# Patient Record
Sex: MALE | Race: WHITE | Employment: FULL TIME | URBAN - METROPOLITAN AREA
[De-identification: names, ages, dates, MRNs, and addresses within clinical notes are randomized per-mention and may not be internally consistent; named-entity substitution may affect disease eponyms.]

---

## 2017-03-01 ENCOUNTER — OFFICE VISIT (OUTPATIENT)
Dept: FAMILY MEDICINE CLINIC | Facility: CLINIC | Age: 42
End: 2017-03-01

## 2017-03-01 ENCOUNTER — TELEPHONE (OUTPATIENT)
Dept: FAMILY MEDICINE CLINIC | Facility: CLINIC | Age: 42
End: 2017-03-01

## 2017-03-01 VITALS
HEART RATE: 96 BPM | SYSTOLIC BLOOD PRESSURE: 136 MMHG | DIASTOLIC BLOOD PRESSURE: 72 MMHG | RESPIRATION RATE: 18 BRPM | BODY MASS INDEX: 29.59 KG/M2 | WEIGHT: 199.81 LBS | TEMPERATURE: 100 F | HEIGHT: 69 IN

## 2017-03-01 DIAGNOSIS — R50.9 FEVER, UNSPECIFIED FEVER CAUSE: ICD-10-CM

## 2017-03-01 DIAGNOSIS — H65.01 RIGHT ACUTE SEROUS OTITIS MEDIA, RECURRENCE NOT SPECIFIED: Primary | ICD-10-CM

## 2017-03-01 PROCEDURE — 99213 OFFICE O/P EST LOW 20 MIN: CPT | Performed by: INTERNAL MEDICINE

## 2017-03-01 RX ORDER — AMOXICILLIN AND CLAVULANATE POTASSIUM 875; 125 MG/1; MG/1
1 TABLET, FILM COATED ORAL 2 TIMES DAILY
Qty: 20 TABLET | Refills: 0 | Status: SHIPPED | OUTPATIENT
Start: 2017-03-01 | End: 2017-03-11

## 2017-03-01 RX ORDER — CODEINE PHOSPHATE AND GUAIFENESIN 10; 100 MG/5ML; MG/5ML
5 SOLUTION ORAL EVERY 6 HOURS PRN
Qty: 120 ML | Refills: 0 | Status: SHIPPED
Start: 2017-03-01 | End: 2017-03-11

## 2017-03-01 NOTE — PROGRESS NOTES
CC: Patient presents with:   Body ache and/or chills: x 1 week - worse at night  Night Sweats  Wheezing: SOB  Ear Pain  Fever       HPI:     Symtoms started   Night sweats  Wheezing and shortness of breath   + bilateral ear pain   + fever  + shortness o normocephalic, OP-erythematous, PERRL, TM: significant erythema through the right canal with bulging TM   NECK: supple, + adenopathy  LUNGS: crackles at b/l lung bases  CARDIO: RRR without murmur  GI: good BS's,NT/ND, no masses or HSM  EXTREMITIES: no cyan

## 2017-03-15 ENCOUNTER — OFFICE VISIT (OUTPATIENT)
Dept: FAMILY MEDICINE CLINIC | Facility: CLINIC | Age: 42
End: 2017-03-15

## 2017-03-15 VITALS
HEART RATE: 72 BPM | HEIGHT: 69 IN | DIASTOLIC BLOOD PRESSURE: 76 MMHG | WEIGHT: 195.81 LBS | RESPIRATION RATE: 16 BRPM | BODY MASS INDEX: 29 KG/M2 | TEMPERATURE: 99 F | SYSTOLIC BLOOD PRESSURE: 120 MMHG

## 2017-03-15 DIAGNOSIS — M54.16 LUMBAR RADICULOPATHY: ICD-10-CM

## 2017-03-15 DIAGNOSIS — I10 ESSENTIAL HYPERTENSION: ICD-10-CM

## 2017-03-15 DIAGNOSIS — Z00.00 ANNUAL PHYSICAL EXAM: Primary | ICD-10-CM

## 2017-03-15 DIAGNOSIS — Z00.00 LABORATORY EXAMINATION ORDERED AS PART OF A ROUTINE GENERAL MEDICAL EXAMINATION: ICD-10-CM

## 2017-03-15 DIAGNOSIS — F41.1 ANXIETY STATE: ICD-10-CM

## 2017-03-15 PROCEDURE — 99396 PREV VISIT EST AGE 40-64: CPT | Performed by: INTERNAL MEDICINE

## 2017-03-15 PROCEDURE — 99213 OFFICE O/P EST LOW 20 MIN: CPT | Performed by: INTERNAL MEDICINE

## 2017-03-15 RX ORDER — HYDROCODONE BITARTRATE AND ACETAMINOPHEN 10; 325 MG/1; MG/1
1 TABLET ORAL 2 TIMES DAILY PRN
Qty: 60 TABLET | Refills: 0 | Status: SHIPPED | OUTPATIENT
Start: 2017-03-15 | End: 2017-10-03

## 2017-03-15 NOTE — PROGRESS NOTES
REASON FOR VISIT:    Jaci Duron is a 37year old male who presents for an [address].     Needs a refill on norco  Still doing inversion table/ massage therapy/     Hypertension  Denies any chest pain or shortness of breath  Denies any l ALT 16 02/14/2014       Lab Results  Component Value Date   TSH 1.960 07/18/2016       Lab Results  Component Value Date   BUN 12 07/18/2016   BUN 15 07/11/2014   BUN 13 02/14/2014   CREATSERUM 1.03 07/18/2016   CREATSERUM 1.02 07/11/2014   CREATSERUM 1. (calc))   Date Value   07/11/2014 148*       Diabetes Screening  If history of high blood pressure or other  risk factors HGBA1C (%)   Date Value   07/18/2016 5.4     GLUCOSE (mg/dL)   Date Value   07/18/2016 108*   07/11/2014 88         Gonorrhea Screenin Tab Take 1 tablet by mouth 2 (two) times daily as needed for Pain. Disp: 60 tablet Rfl: 0   PARoxetine HCl 20 MG Oral Tab Take 1 tablet (20 mg total) by mouth daily.  Disp: 90 tablet Rfl: 3   lisinopril 40 MG Oral Tab Take 1 tablet (40 mg total) by mouth da anemia  ENDOCRINE: denies thyroid history  ALL/ASTHMA: denies hx of allergy or asthma    EXAM:   /76 mmHg  Pulse 72  Temp(Src) 98.5 °F (36.9 °C) (Temporal)  Resp 16  Ht 69\"  Wt 195 lb 12.8 oz  BMI 28.90 kg/m2  GENERAL: well developed, well nourished understanding of these issues and agrees to the plan.   The patient is asked to return in 1 year for evaluation   Diet counseling perfomed  Exercise counseling perfomed  Dailiy aspirin Risk/Benefits counseling performed    SUGGESTED VACCINATIONS - Influenza

## 2017-06-13 RX ORDER — LISINOPRIL 40 MG/1
40 TABLET ORAL DAILY
Qty: 90 TABLET | Refills: 1 | Status: SHIPPED | OUTPATIENT
Start: 2017-06-13 | End: 2017-10-03

## 2017-06-13 NOTE — TELEPHONE ENCOUNTER
Requesting Lisinopril 40mg daily  LOV: 3/15/17  RTC: 1 year  Last Labs: 7/18/16  Filled: 12/6/16 #90 with 1 refills    Future Appointments  Date Time Provider Dania Govea   8/31/2017 8:00 AM [facility] CT MAIN RM2 [facility] [place] protocol - me

## 2017-08-31 ENCOUNTER — HOSPITAL ENCOUNTER (OUTPATIENT)
Dept: CT IMAGING | Facility: HOSPITAL | Age: 42
Discharge: HOME | End: 2017-08-31
Attending: INTERNAL MEDICINE

## 2017-08-31 DIAGNOSIS — Z13.9 ENCOUNTER FOR SCREENING: ICD-10-CM

## 2017-09-08 ENCOUNTER — TELEPHONE (OUTPATIENT)
Dept: FAMILY MEDICINE CLINIC | Facility: CLINIC | Age: 42
End: 2017-09-08

## 2017-09-08 DIAGNOSIS — J84.10 CALCIFIED GRANULOMA OF LUNG (HCC): Primary | ICD-10-CM

## 2017-09-08 NOTE — TELEPHONE ENCOUNTER
I spoke with CT department per Dr. Ricki Winchester request to verify what kind of CT of the chest is best if patient has calcified granuloma of lung - per radiologist it needs to be CT plain (without contrast)  Order placed

## 2017-09-08 NOTE — TELEPHONE ENCOUNTER
Dr had approved CT with and without and radiology said best if plain. That order was placed, patient aware and CT with and without was cancelled by me.     Time started: 7695    Time ended: 1248    Total time spent on chart: 2min

## 2017-10-03 ENCOUNTER — OFFICE VISIT (OUTPATIENT)
Dept: FAMILY MEDICINE CLINIC | Facility: CLINIC | Age: 42
End: 2017-10-03

## 2017-10-03 VITALS
WEIGHT: 197.81 LBS | HEIGHT: 69 IN | DIASTOLIC BLOOD PRESSURE: 82 MMHG | TEMPERATURE: 98 F | RESPIRATION RATE: 16 BRPM | HEART RATE: 76 BPM | BODY MASS INDEX: 29.3 KG/M2 | SYSTOLIC BLOOD PRESSURE: 124 MMHG

## 2017-10-03 DIAGNOSIS — F41.1 ANXIETY STATE: ICD-10-CM

## 2017-10-03 DIAGNOSIS — E78.2 MIXED HYPERLIPIDEMIA: ICD-10-CM

## 2017-10-03 DIAGNOSIS — M54.16 LUMBAR RADICULOPATHY: ICD-10-CM

## 2017-10-03 DIAGNOSIS — J84.10 CALCIFIED GRANULOMA OF LUNG (HCC): ICD-10-CM

## 2017-10-03 DIAGNOSIS — I10 ESSENTIAL HYPERTENSION: Primary | ICD-10-CM

## 2017-10-03 PROBLEM — J98.4 CALCIFIED GRANULOMA OF LUNG: Status: ACTIVE | Noted: 2017-10-03

## 2017-10-03 PROCEDURE — 99214 OFFICE O/P EST MOD 30 MIN: CPT | Performed by: INTERNAL MEDICINE

## 2017-10-03 RX ORDER — LISINOPRIL 40 MG/1
40 TABLET ORAL DAILY
Qty: 90 TABLET | Refills: 3 | Status: SHIPPED | OUTPATIENT
Start: 2017-10-03 | End: 2018-12-03

## 2017-10-03 RX ORDER — HYDROCODONE BITARTRATE AND ACETAMINOPHEN 10; 325 MG/1; MG/1
1 TABLET ORAL 2 TIMES DAILY PRN
Qty: 60 TABLET | Refills: 0 | Status: SHIPPED | OUTPATIENT
Start: 2017-10-03 | End: 2018-02-14

## 2017-10-03 RX ORDER — PAROXETINE HYDROCHLORIDE 20 MG/1
20 TABLET, FILM COATED ORAL DAILY
Qty: 90 TABLET | Refills: 3 | Status: SHIPPED | OUTPATIENT
Start: 2017-10-03 | End: 2018-12-03

## 2017-10-03 RX ORDER — ATORVASTATIN CALCIUM 20 MG/1
20 TABLET, FILM COATED ORAL NIGHTLY
Qty: 90 TABLET | Refills: 1 | Status: SHIPPED | OUTPATIENT
Start: 2017-10-03 | End: 2018-02-14

## 2017-10-03 RX ORDER — ATORVASTATIN CALCIUM 20 MG/1
20 TABLET, FILM COATED ORAL NIGHTLY
Qty: 90 TABLET | Refills: 1 | Status: SHIPPED | OUTPATIENT
Start: 2017-10-03 | End: 2017-10-03

## 2017-10-03 NOTE — PROGRESS NOTES
CC: Patient presents with:   Follow - Up  Test Results: CT Calcium Score done 8/2017  Medication Request: refill [place]  Imm/Inj: states will get Flu vaccine at his work       HPI:     Still struggling with back and foot pain   Still using tens unit and 29.21 kg/m²   GENERAL: well developed, well nourished,in no apparent distress, A/O x3  SKIN: no rashes,no suspicious lesions  HEENT: atraumatic, normocephalic, OP-clear, PERRLA  NECK: supple,no adenopathy  LUNGS: clear to auscultation bilaterally   CARDIO:

## 2018-02-14 ENCOUNTER — OFFICE VISIT (OUTPATIENT)
Dept: FAMILY MEDICINE CLINIC | Facility: CLINIC | Age: 43
End: 2018-02-14

## 2018-02-14 VITALS
BODY MASS INDEX: 29.27 KG/M2 | HEART RATE: 82 BPM | OXYGEN SATURATION: 99 % | SYSTOLIC BLOOD PRESSURE: 124 MMHG | WEIGHT: 197.63 LBS | HEIGHT: 69 IN | RESPIRATION RATE: 16 BRPM | DIASTOLIC BLOOD PRESSURE: 70 MMHG | TEMPERATURE: 98 F

## 2018-02-14 DIAGNOSIS — F41.1 ANXIETY STATE: ICD-10-CM

## 2018-02-14 DIAGNOSIS — Z00.00 WELLNESS EXAMINATION: Primary | ICD-10-CM

## 2018-02-14 DIAGNOSIS — E78.2 MIXED HYPERLIPIDEMIA: ICD-10-CM

## 2018-02-14 DIAGNOSIS — M54.16 LUMBAR RADICULOPATHY: ICD-10-CM

## 2018-02-14 DIAGNOSIS — I10 ESSENTIAL HYPERTENSION: ICD-10-CM

## 2018-02-14 PROCEDURE — 99396 PREV VISIT EST AGE 40-64: CPT | Performed by: PHYSICIAN ASSISTANT

## 2018-02-14 RX ORDER — HYDROCODONE BITARTRATE AND ACETAMINOPHEN 10; 325 MG/1; MG/1
1 TABLET ORAL 2 TIMES DAILY PRN
Qty: 60 TABLET | Refills: 0 | Status: SHIPPED | OUTPATIENT
Start: 2018-02-14 | End: 2018-12-13

## 2018-02-14 NOTE — PATIENT INSTRUCTIONS
Thank you for choosing Horald Mcardle, PA-C at [place] [address]  To Do: Erinn Jany  1. Pt to continue medications  2. Get lab work done  3.  Follow-up in 6 months, sooner if problems    • Please signup for MY CHART, which is electronic access to your testing, please call Central Scheduling at [phone]  Please allow our office 5 business days to contact you regarding any testing results.     Refill policies:   Allow 3 business days for refills; controlled substances may take longer and must be p

## 2018-02-14 NOTE — PROGRESS NOTES
REASON FOR VISIT:    Odin Anthony is a 40year old male who presents for an [address].     HLD  Pt was started on lipitor  And had bad muscle aches, also had that with simvastatin    Anxiety  Pt is doing well on paroxetine    Lumbar radic  (H) 02/14/2014       Lab Results  Component Value Date   AST 20 07/18/2016   AST 20 07/11/2014   AST 16 02/14/2014       Lab Results  Component Value Date   ALT 36 07/18/2016   ALT 20 07/11/2014   ALT 16 02/14/2014       Lab Results  Component Va Cholesterol Screening Recommended screening varies with age, risk and gender LDL-CHOLESTEROL (mg/dL (calc))   Date Value   07/11/2014 148 (H)     LDL Cholesterol (mg/dL)   Date Value   07/18/2016 102       Diabetes Screening  If history of high blood pre found. Asthma  (Annually between Nov. 1 & Dec. 31)    Date of last AAP/ACT and counseling given on importance of controller meds.                    ALLERGIES:     Simvastatin             Myalgia    CURRENT MEDICATIONS:     Current Outpatient [facility] or ST  LUNGS: denies shortness of breath with exertion  CARDIOVASCULAR: denies chest pain on exertion  GI: denies abdominal pain, denies heartburn  : denies nocturia or changes in stream  MUSCULOSKELETAL: denies back pain  NEURO: denies headaches  PSYCHE hypertension  Chronic controlled on lisinopril  Will continue    Anxiety state  Chronic controlled on paroxetine  Will continue     The patient indicates understanding of these issues and agrees to the plan.   The patient is asked to return in 6 months  Die

## 2018-04-26 ENCOUNTER — TELEPHONE (OUTPATIENT)
Dept: FAMILY MEDICINE CLINIC | Facility: CLINIC | Age: 43
End: 2018-04-26

## 2018-12-03 ENCOUNTER — TELEPHONE (OUTPATIENT)
Dept: FAMILY MEDICINE CLINIC | Facility: CLINIC | Age: 43
End: 2018-12-03

## 2018-12-03 RX ORDER — PAROXETINE HYDROCHLORIDE 20 MG/1
20 TABLET, FILM COATED ORAL DAILY
Qty: 30 TABLET | Refills: 0 | Status: SHIPPED | OUTPATIENT
Start: 2018-12-03 | End: 2018-12-13

## 2018-12-03 RX ORDER — LISINOPRIL 40 MG/1
40 TABLET ORAL DAILY
Qty: 30 TABLET | Refills: 0 | Status: SHIPPED | OUTPATIENT
Start: 2018-12-03 | End: 2018-12-13

## 2018-12-03 NOTE — TELEPHONE ENCOUNTER
Authorized Provider - Niraj Silveira    lisinopril 40 MG Oral Tab #90  AND   PARoxetine HCl 20 MG Oral Tab #90    [facility]

## 2018-12-03 NOTE — TELEPHONE ENCOUNTER
Requesting Lisinopril and Paroxetine  LOV: 2/14/18  RTC: 6 months  Last Relevant Labs: 7/18/16  Filled: 10/3/17 #90 with 3 refills    No future appointments. Patient is overdue for labs and office visit.    One month exception refills sent with note to f

## 2018-12-13 ENCOUNTER — OFFICE VISIT (OUTPATIENT)
Dept: FAMILY MEDICINE CLINIC | Facility: CLINIC | Age: 43
End: 2018-12-13
Payer: COMMERCIAL

## 2018-12-13 VITALS
DIASTOLIC BLOOD PRESSURE: 78 MMHG | WEIGHT: 193 LBS | SYSTOLIC BLOOD PRESSURE: 124 MMHG | HEART RATE: 68 BPM | RESPIRATION RATE: 16 BRPM | BODY MASS INDEX: 28.58 KG/M2 | HEIGHT: 69 IN

## 2018-12-13 DIAGNOSIS — F41.9 ANXIETY: ICD-10-CM

## 2018-12-13 DIAGNOSIS — Z01.89 ROUTINE LAB DRAW: ICD-10-CM

## 2018-12-13 DIAGNOSIS — M54.16 LUMBAR RADICULOPATHY: Primary | ICD-10-CM

## 2018-12-13 DIAGNOSIS — E78.2 MIXED HYPERLIPIDEMIA: ICD-10-CM

## 2018-12-13 DIAGNOSIS — I10 ESSENTIAL HYPERTENSION: ICD-10-CM

## 2018-12-13 PROCEDURE — 99214 OFFICE O/P EST MOD 30 MIN: CPT | Performed by: PHYSICIAN ASSISTANT

## 2018-12-13 RX ORDER — PAROXETINE HYDROCHLORIDE 20 MG/1
20 TABLET, FILM COATED ORAL DAILY
Qty: 90 TABLET | Refills: 3 | Status: SHIPPED | OUTPATIENT
Start: 2018-12-13 | End: 2020-01-02

## 2018-12-13 RX ORDER — HYDROCODONE BITARTRATE AND ACETAMINOPHEN 10; 325 MG/1; MG/1
1 TABLET ORAL 2 TIMES DAILY PRN
Qty: 60 TABLET | Refills: 0 | Status: SHIPPED | OUTPATIENT
Start: 2018-12-13 | End: 2019-08-21

## 2018-12-13 RX ORDER — LISINOPRIL 40 MG/1
40 TABLET ORAL DAILY
Qty: 90 TABLET | Refills: 3 | Status: SHIPPED | OUTPATIENT
Start: 2018-12-13 | End: 2020-01-02

## 2018-12-13 NOTE — PROGRESS NOTES
[facility] Internal Medicine Progress Note    CC:  Patient presents with:  Hypertension  Medication Request      HPI:   HPI  HTN  Chronic stable on lisinopril  Denies any SOB/CP, leg swelling, headaches    Lumbar radicular pain  Pt has history telly light.   Neck: Neck supple. No thyromegaly present. Cardiovascular: Normal rate, regular rhythm and normal heart sounds. Exam reveals no gallop and no friction rub. No murmur heard.   Pulmonary/Chest: Effort normal and breath sounds normal. No respirato Outcome: Patient verbalizes understanding.     Problem List:  Patient Active Problem List:     Mixed hyperlipidemia     Screening for lipoid disorders     Irritable bowel syndrome     Anxiety state     Essential hypertension     Renal artery stenosis (HCC)

## 2018-12-13 NOTE — PATIENT INSTRUCTIONS
Thank you for choosing Bhumika Valenzuela PA-C at [place] [address]  To Do: Krista Silveira  1. Pt to get labs done  2. Continue medications  3.  Follow-up 6 months, sooner if problems    • Please signup for MY CHART, which is electronic access to your r testing, please call Central Scheduling at [phone]  Please allow our office 5 business days to contact you regarding any testing results.     Refill policies:   Allow 3 business days for refills; controlled substances may take longer and must be picked

## 2019-02-07 ENCOUNTER — LAB ENCOUNTER (OUTPATIENT)
Dept: LAB | Age: 44
End: 2019-02-07
Attending: PHYSICIAN ASSISTANT
Payer: COMMERCIAL

## 2019-02-07 DIAGNOSIS — Z01.89 ROUTINE LAB DRAW: ICD-10-CM

## 2019-02-07 LAB
ALBUMIN SERPL-MCNC: 4 G/DL (ref 3.1–4.5)
ALBUMIN/GLOB SERPL: 1.3 {RATIO} (ref 1–2)
ALP LIVER SERPL-CCNC: 48 U/L (ref 45–117)
ALT SERPL-CCNC: 27 U/L (ref 17–63)
ANION GAP SERPL CALC-SCNC: 7 MMOL/L (ref 0–18)
AST SERPL-CCNC: 20 U/L (ref 15–41)
BASOPHILS # BLD AUTO: 0.03 X10(3) UL (ref 0–0.2)
BASOPHILS NFR BLD AUTO: 0.5 %
BILIRUB SERPL-MCNC: 0.5 MG/DL (ref 0.1–2)
BUN BLD-MCNC: 13 MG/DL (ref 8–20)
BUN/CREAT SERPL: 13.5 (ref 10–20)
CALCIUM BLD-MCNC: 8.7 MG/DL (ref 8.3–10.3)
CHLORIDE SERPL-SCNC: 107 MMOL/L (ref 101–111)
CHOLEST SMN-MCNC: 223 MG/DL (ref ?–200)
CO2 SERPL-SCNC: 25 MMOL/L (ref 22–32)
CREAT BLD-MCNC: 0.96 MG/DL (ref 0.7–1.3)
DEPRECATED RDW RBC AUTO: 39.6 FL (ref 35.1–46.3)
EOSINOPHIL # BLD AUTO: 0.19 X10(3) UL (ref 0–0.7)
EOSINOPHIL NFR BLD AUTO: 3.4 %
ERYTHROCYTE [DISTWIDTH] IN BLOOD BY AUTOMATED COUNT: 12.8 % (ref 11–15)
GLOBULIN PLAS-MCNC: 3.2 G/DL (ref 2.8–4.4)
GLUCOSE BLD-MCNC: 94 MG/DL (ref 70–99)
HAV AB SERPL IA-ACNC: 327 PG/ML (ref 193–986)
HCT VFR BLD AUTO: 48.3 % (ref 39–53)
HDLC SERPL-MCNC: 34 MG/DL (ref 40–59)
HGB BLD-MCNC: 16.4 G/DL (ref 13–17.5)
IMM GRANULOCYTES # BLD AUTO: 0.01 X10(3) UL (ref 0–1)
IMM GRANULOCYTES NFR BLD: 0.2 %
LDLC SERPL CALC-MCNC: 127 MG/DL (ref ?–100)
LYMPHOCYTES # BLD AUTO: 2.67 X10(3) UL (ref 1–4)
LYMPHOCYTES NFR BLD AUTO: 48.3 %
M PROTEIN MFR SERPL ELPH: 7.2 G/DL (ref 6.4–8.2)
MCH RBC QN AUTO: 29.2 PG (ref 26–34)
MCHC RBC AUTO-ENTMCNC: 34 G/DL (ref 31–37)
MCV RBC AUTO: 86.1 FL (ref 80–100)
MONOCYTES # BLD AUTO: 0.37 X10(3) UL (ref 0.1–1)
MONOCYTES NFR BLD AUTO: 6.7 %
NEUTROPHILS # BLD AUTO: 2.26 X10 (3) UL (ref 1.5–7.7)
NEUTROPHILS # BLD AUTO: 2.26 X10(3) UL (ref 1.5–7.7)
NEUTROPHILS NFR BLD AUTO: 40.9 %
NONHDLC SERPL-MCNC: 189 MG/DL (ref ?–130)
OSMOLALITY SERPL CALC.SUM OF ELEC: 288 MOSM/KG (ref 275–295)
PLATELET # BLD AUTO: 164 10(3)UL (ref 150–450)
POTASSIUM SERPL-SCNC: 4 MMOL/L (ref 3.6–5.1)
RBC # BLD AUTO: 5.61 X10(6)UL (ref 4.3–5.7)
SODIUM SERPL-SCNC: 139 MMOL/L (ref 136–144)
T4 FREE SERPL-MCNC: 0.8 NG/DL (ref 0.9–1.8)
TRIGL SERPL-MCNC: 309 MG/DL (ref 30–149)
TSI SER-ACNC: 3.21 MIU/ML (ref 0.35–5.5)
VIT D+METAB SERPL-MCNC: 19.3 NG/ML (ref 30–100)
VLDLC SERPL CALC-MCNC: 62 MG/DL (ref 0–30)
WBC # BLD AUTO: 5.5 X10(3) UL (ref 4–11)

## 2019-02-07 PROCEDURE — 36415 COLL VENOUS BLD VENIPUNCTURE: CPT | Performed by: PHYSICIAN ASSISTANT

## 2019-02-07 PROCEDURE — 82306 VITAMIN D 25 HYDROXY: CPT | Performed by: PHYSICIAN ASSISTANT

## 2019-02-07 PROCEDURE — 80061 LIPID PANEL: CPT | Performed by: PHYSICIAN ASSISTANT

## 2019-02-07 PROCEDURE — 80050 GENERAL HEALTH PANEL: CPT | Performed by: PHYSICIAN ASSISTANT

## 2019-02-07 PROCEDURE — 84439 ASSAY OF FREE THYROXINE: CPT | Performed by: PHYSICIAN ASSISTANT

## 2019-02-07 PROCEDURE — 82607 VITAMIN B-12: CPT | Performed by: PHYSICIAN ASSISTANT

## 2019-02-11 ENCOUNTER — TELEPHONE (OUTPATIENT)
Dept: FAMILY MEDICINE CLINIC | Facility: CLINIC | Age: 44
End: 2019-02-11

## 2019-02-11 DIAGNOSIS — R79.89 ELEVATED TSH: ICD-10-CM

## 2019-02-11 DIAGNOSIS — E78.2 ELEVATED CHOLESTEROL WITH ELEVATED TRIGLYCERIDES: Primary | ICD-10-CM

## 2019-02-11 RX ORDER — ERGOCALCIFEROL 1.25 MG/1
50000 CAPSULE ORAL WEEKLY
Qty: 12 CAPSULE | Refills: 0 | Status: SHIPPED | OUTPATIENT
Start: 2019-02-11 | End: 2019-05-12

## 2019-02-11 RX ORDER — FENOFIBRATE 120 MG/1
1 TABLET ORAL DAILY
Qty: 30 TABLET | Refills: 0 | Status: SHIPPED | OUTPATIENT
Start: 2019-02-11 | End: 2019-03-12

## 2019-02-11 NOTE — PROGRESS NOTES
Cholesterol and triglycerides are elevated, I would recommend starting a medication if he is not opposed, I know he does not want a statin, so I would recommend fenofibrate 120mg daily  TSH is more elevated than typical and Free T4 is low, I would repeat T

## 2019-02-11 NOTE — TELEPHONE ENCOUNTER
----- Message from Raymundo Fernandes PA-C sent at 2/11/2019  8:03 AM CST -----  Cholesterol and triglycerides are elevated, I would recommend starting a medication if he is not opposed, I know he does not want a statin, so I would recommend fenofibrate 1

## 2019-02-11 NOTE — TELEPHONE ENCOUNTER
----- Message from Zurdo Betts PA-C sent at 2/11/2019  8:03 AM CST -----  Cholesterol and triglycerides are elevated, I would recommend starting a medication if he is not opposed, I know he does not want a statin, so I would recommend fenofibrate 1

## 2019-03-12 DIAGNOSIS — E78.2 MIXED HYPERLIPIDEMIA: Primary | ICD-10-CM

## 2019-03-12 DIAGNOSIS — E78.2 ELEVATED CHOLESTEROL WITH ELEVATED TRIGLYCERIDES: ICD-10-CM

## 2019-03-13 RX ORDER — FENOFIBRATE 120 MG/1
TABLET ORAL
Qty: 30 TABLET | Refills: 1 | Status: SHIPPED | OUTPATIENT
Start: 2019-03-13 | End: 2019-08-21

## 2019-05-06 RX ORDER — ERGOCALCIFEROL 1.25 MG/1
CAPSULE ORAL
Qty: 12 CAPSULE | Refills: 0 | OUTPATIENT
Start: 2019-05-06

## 2019-05-06 NOTE — TELEPHONE ENCOUNTER
Requesting Vitamin D 50,000units weekly  LOV: 12/13/18  RTC: 6 months  Last Relevant Labs: 2/7/19  Filled: 2/11/19 #12 with 0 refills    No future appointments.     Notes recorded by Rogelio Noel PA-C on 2/11/2019 at 8:03 AM CST  Low Vit D, begin 50

## 2019-07-01 ENCOUNTER — RESULT REVIEW (OUTPATIENT)
Age: 44
End: 2019-07-01

## 2019-08-14 ENCOUNTER — TELEPHONE (OUTPATIENT)
Dept: FAMILY MEDICINE CLINIC | Facility: CLINIC | Age: 44
End: 2019-08-14

## 2019-08-14 ENCOUNTER — LAB ENCOUNTER (OUTPATIENT)
Dept: LAB | Age: 44
End: 2019-08-14
Attending: PHYSICIAN ASSISTANT
Payer: COMMERCIAL

## 2019-08-14 DIAGNOSIS — R79.89 ELEVATED TSH: ICD-10-CM

## 2019-08-14 DIAGNOSIS — E78.2 MIXED HYPERLIPIDEMIA: ICD-10-CM

## 2019-08-14 DIAGNOSIS — R53.83 FATIGUE, UNSPECIFIED TYPE: ICD-10-CM

## 2019-08-14 DIAGNOSIS — E78.2 ELEVATED CHOLESTEROL WITH ELEVATED TRIGLYCERIDES: ICD-10-CM

## 2019-08-14 DIAGNOSIS — R53.83 FATIGUE, UNSPECIFIED TYPE: Primary | ICD-10-CM

## 2019-08-14 LAB
ALT SERPL-CCNC: 31 U/L (ref 16–61)
AST SERPL-CCNC: 15 U/L (ref 15–37)
CHOLEST SMN-MCNC: 226 MG/DL (ref ?–200)
HDLC SERPL-MCNC: 32 MG/DL (ref 40–59)
LDLC SERPL CALC-MCNC: 125 MG/DL (ref ?–100)
NONHDLC SERPL-MCNC: 194 MG/DL (ref ?–130)
T3FREE SERPL-MCNC: 2.92 PG/ML (ref 2.4–4.2)
T4 FREE SERPL-MCNC: 0.7 NG/DL (ref 0.8–1.7)
TRIGL SERPL-MCNC: 343 MG/DL (ref 30–149)
TSI SER-ACNC: 3.02 MIU/ML (ref 0.36–3.74)
VLDLC SERPL CALC-MCNC: 69 MG/DL (ref 0–30)

## 2019-08-14 PROCEDURE — 84481 FREE ASSAY (FT-3): CPT

## 2019-08-14 PROCEDURE — 84402 ASSAY OF FREE TESTOSTERONE: CPT

## 2019-08-14 PROCEDURE — 84443 ASSAY THYROID STIM HORMONE: CPT

## 2019-08-14 PROCEDURE — 36415 COLL VENOUS BLD VENIPUNCTURE: CPT

## 2019-08-14 PROCEDURE — 84403 ASSAY OF TOTAL TESTOSTERONE: CPT

## 2019-08-14 PROCEDURE — 84450 TRANSFERASE (AST) (SGOT): CPT

## 2019-08-14 PROCEDURE — 84439 ASSAY OF FREE THYROXINE: CPT

## 2019-08-14 PROCEDURE — 84460 ALANINE AMINO (ALT) (SGPT): CPT

## 2019-08-14 PROCEDURE — 80061 LIPID PANEL: CPT

## 2019-08-18 LAB
TESTOSTERONE TOTAL: 328 NG/DL
TESTOSTERONE, FREE -MS/MS: 109.9 PG/ML

## 2019-08-21 ENCOUNTER — OFFICE VISIT (OUTPATIENT)
Dept: FAMILY MEDICINE CLINIC | Facility: CLINIC | Age: 44
End: 2019-08-21
Payer: COMMERCIAL

## 2019-08-21 VITALS
HEART RATE: 83 BPM | DIASTOLIC BLOOD PRESSURE: 72 MMHG | BODY MASS INDEX: 29.03 KG/M2 | HEIGHT: 69 IN | SYSTOLIC BLOOD PRESSURE: 124 MMHG | WEIGHT: 196 LBS | OXYGEN SATURATION: 99 % | RESPIRATION RATE: 16 BRPM | TEMPERATURE: 97 F

## 2019-08-21 DIAGNOSIS — I10 ESSENTIAL HYPERTENSION: Primary | ICD-10-CM

## 2019-08-21 DIAGNOSIS — M54.16 LUMBAR RADICULOPATHY: ICD-10-CM

## 2019-08-21 DIAGNOSIS — E78.2 ELEVATED CHOLESTEROL WITH ELEVATED TRIGLYCERIDES: ICD-10-CM

## 2019-08-21 DIAGNOSIS — E03.9 HYPOTHYROIDISM, UNSPECIFIED TYPE: ICD-10-CM

## 2019-08-21 PROCEDURE — 99214 OFFICE O/P EST MOD 30 MIN: CPT | Performed by: PHYSICIAN ASSISTANT

## 2019-08-21 RX ORDER — FENOFIBRATE 120 MG/1
1 TABLET ORAL
Qty: 90 TABLET | Refills: 3 | Status: SHIPPED | OUTPATIENT
Start: 2019-08-21 | End: 2020-02-19

## 2019-08-21 RX ORDER — LEVOTHYROXINE SODIUM 0.03 MG/1
25 TABLET ORAL
Qty: 90 TABLET | Refills: 1 | Status: SHIPPED | OUTPATIENT
Start: 2019-08-21 | End: 2020-09-02 | Stop reason: DRUGHIGH

## 2019-08-21 RX ORDER — HYDROCODONE BITARTRATE AND ACETAMINOPHEN 10; 325 MG/1; MG/1
1 TABLET ORAL 2 TIMES DAILY PRN
Qty: 60 TABLET | Refills: 0 | Status: SHIPPED | OUTPATIENT
Start: 2019-08-21 | End: 2020-09-02

## 2019-08-21 NOTE — PATIENT INSTRUCTIONS
Thank you for choosing Jose Tello PA-C at [place] [address]  To Do: Elayne Libman  1. Begin medications as prescribed  2. Get labs done in 6 months  3.   Follow-up in 6 months pending labs    • Please signup for MY CHART, which is electronic approved your testing, please call Central Scheduling at [phone]  Please allow our office 5 business days to contact you regarding any testing results.     Refill policies:   Allow 3 business days for refills; controlled substances may take longer and

## 2019-08-21 NOTE — PROGRESS NOTES
[facility] Internal Medicine Progress Note    CC:  Patient presents with:  Blood Pressure: bp check      HPI:   HPI  Patient is a 44-year-old male here to follow-up on his hypertension, hyperlipidemia, and recent lab work.   Patient is doing well back pain and myalgias. Neurological: Negative for dizziness, light-headedness and headaches. Psychiatric/Behavioral: Negative for dysphoric mood, self-injury, sleep disturbance and suicidal ideas. The patient is not nervous/anxious.           /72 months    RTC 6 to 12 months    Orders Placed This Encounter      Comp Metabolic Panel (14) [E]      Lipid Panel [E]      TSH and Free T4 [E]      Meds & Refills for this Visit:  Requested Prescriptions     Signed Prescriptions Disp Refills   • Fenofibrate

## 2019-08-22 ENCOUNTER — TELEPHONE (OUTPATIENT)
Dept: FAMILY MEDICINE CLINIC | Facility: CLINIC | Age: 44
End: 2019-08-22

## 2019-08-22 RX ORDER — FENOFIBRATE 145 MG/1
145 TABLET, COATED ORAL DAILY
Qty: 90 TABLET | Refills: 3 | Status: SHIPPED | OUTPATIENT
Start: 2019-08-22 | End: 2020-08-13

## 2019-08-22 NOTE — TELEPHONE ENCOUNTER
Alternative medication request received via fax from [facility] stating that  FENOFIBRATE 120 MG TABLET is not covered by pt's insurance. Alternative requested- FENOFIBRATE  MG, 145 MG,  MG are covered by pt's insurance plan.

## 2019-12-26 ENCOUNTER — TRANSCRIPTION ENCOUNTER (OUTPATIENT)
Age: 44
End: 2019-12-26

## 2020-01-02 RX ORDER — PAROXETINE HYDROCHLORIDE 20 MG/1
20 TABLET, FILM COATED ORAL DAILY
Qty: 90 TABLET | Refills: 0 | Status: SHIPPED | OUTPATIENT
Start: 2020-01-02 | End: 2020-03-26

## 2020-01-02 RX ORDER — LISINOPRIL 40 MG/1
40 TABLET ORAL DAILY
Qty: 90 TABLET | Refills: 0 | Status: SHIPPED | OUTPATIENT
Start: 2020-01-02 | End: 2020-03-27

## 2020-02-18 ENCOUNTER — LAB ENCOUNTER (OUTPATIENT)
Dept: LAB | Age: 45
End: 2020-02-18
Attending: PHYSICIAN ASSISTANT
Payer: COMMERCIAL

## 2020-02-18 DIAGNOSIS — E03.9 HYPOTHYROIDISM, UNSPECIFIED TYPE: ICD-10-CM

## 2020-02-18 DIAGNOSIS — E78.2 ELEVATED CHOLESTEROL WITH ELEVATED TRIGLYCERIDES: ICD-10-CM

## 2020-02-18 LAB
ALBUMIN SERPL-MCNC: 3.9 G/DL (ref 3.4–5)
ALBUMIN/GLOB SERPL: 1.1 {RATIO} (ref 1–2)
ALP LIVER SERPL-CCNC: 32 U/L (ref 45–117)
ALT SERPL-CCNC: 64 U/L (ref 16–61)
ANION GAP SERPL CALC-SCNC: 7 MMOL/L (ref 0–18)
AST SERPL-CCNC: 28 U/L (ref 15–37)
BILIRUB SERPL-MCNC: 0.4 MG/DL (ref 0.1–2)
BUN BLD-MCNC: 14 MG/DL (ref 7–18)
BUN/CREAT SERPL: 11.7 (ref 10–20)
CALCIUM BLD-MCNC: 9.1 MG/DL (ref 8.5–10.1)
CHLORIDE SERPL-SCNC: 109 MMOL/L (ref 98–112)
CHOLEST SMN-MCNC: 199 MG/DL (ref ?–200)
CO2 SERPL-SCNC: 23 MMOL/L (ref 21–32)
CREAT BLD-MCNC: 1.2 MG/DL (ref 0.7–1.3)
GLOBULIN PLAS-MCNC: 3.4 G/DL (ref 2.8–4.4)
GLUCOSE BLD-MCNC: 91 MG/DL (ref 70–99)
HDLC SERPL-MCNC: 33 MG/DL (ref 40–59)
LDLC SERPL CALC-MCNC: 143 MG/DL (ref ?–100)
M PROTEIN MFR SERPL ELPH: 7.3 G/DL (ref 6.4–8.2)
NONHDLC SERPL-MCNC: 166 MG/DL (ref ?–130)
OSMOLALITY SERPL CALC.SUM OF ELEC: 288 MOSM/KG (ref 275–295)
PATIENT FASTING Y/N/NP: YES
PATIENT FASTING Y/N/NP: YES
POTASSIUM SERPL-SCNC: 4 MMOL/L (ref 3.5–5.1)
SODIUM SERPL-SCNC: 139 MMOL/L (ref 136–145)
T4 FREE SERPL-MCNC: 0.9 NG/DL (ref 0.8–1.7)
TRIGL SERPL-MCNC: 113 MG/DL (ref 30–149)
TSI SER-ACNC: 2.13 MIU/ML (ref 0.36–3.74)
VLDLC SERPL CALC-MCNC: 23 MG/DL (ref 0–30)

## 2020-02-18 PROCEDURE — 36415 COLL VENOUS BLD VENIPUNCTURE: CPT

## 2020-02-18 PROCEDURE — 84443 ASSAY THYROID STIM HORMONE: CPT

## 2020-02-18 PROCEDURE — 80061 LIPID PANEL: CPT

## 2020-02-18 PROCEDURE — 84439 ASSAY OF FREE THYROXINE: CPT

## 2020-02-18 PROCEDURE — 80053 COMPREHEN METABOLIC PANEL: CPT

## 2020-02-19 ENCOUNTER — OFFICE VISIT (OUTPATIENT)
Dept: FAMILY MEDICINE CLINIC | Facility: CLINIC | Age: 45
End: 2020-02-19
Payer: COMMERCIAL

## 2020-02-19 VITALS
RESPIRATION RATE: 16 BRPM | TEMPERATURE: 99 F | HEART RATE: 67 BPM | HEIGHT: 69 IN | SYSTOLIC BLOOD PRESSURE: 126 MMHG | WEIGHT: 192 LBS | DIASTOLIC BLOOD PRESSURE: 74 MMHG | OXYGEN SATURATION: 99 % | BODY MASS INDEX: 28.44 KG/M2

## 2020-02-19 DIAGNOSIS — F41.9 ANXIETY: ICD-10-CM

## 2020-02-19 DIAGNOSIS — M54.16 LUMBAR RADICULOPATHY: ICD-10-CM

## 2020-02-19 DIAGNOSIS — E78.2 MIXED HYPERLIPIDEMIA: ICD-10-CM

## 2020-02-19 DIAGNOSIS — I10 ESSENTIAL HYPERTENSION: ICD-10-CM

## 2020-02-19 DIAGNOSIS — Z00.00 WELLNESS EXAMINATION: Primary | ICD-10-CM

## 2020-02-19 DIAGNOSIS — E03.9 HYPOTHYROIDISM, UNSPECIFIED TYPE: ICD-10-CM

## 2020-02-19 PROCEDURE — 99213 OFFICE O/P EST LOW 20 MIN: CPT | Performed by: PHYSICIAN ASSISTANT

## 2020-02-19 PROCEDURE — 99396 PREV VISIT EST AGE 40-64: CPT | Performed by: PHYSICIAN ASSISTANT

## 2020-02-19 RX ORDER — LEVOTHYROXINE SODIUM 50 UG/1
1 CAPSULE ORAL DAILY
Qty: 90 CAPSULE | Refills: 1 | Status: SHIPPED | OUTPATIENT
Start: 2020-02-19 | End: 2020-08-11

## 2020-02-19 RX ORDER — ROSUVASTATIN CALCIUM 10 MG/1
10 TABLET, COATED ORAL NIGHTLY
Qty: 90 TABLET | Refills: 1 | Status: SHIPPED | OUTPATIENT
Start: 2020-02-19 | End: 2020-08-11

## 2020-02-19 NOTE — PATIENT INSTRUCTIONS
Thank you for choosing Abundio Raines PA-C at [place] [address]  To Do: Philip Khan  1. Begin medications as prescribed, with rosuvastatin take co-Q10 as well as vitamin D  2. Begin new dosage of levothyroxine, repeat labs in 3 months  3.   Con up to 10 business days to receive approval from your insurance company.      Once our office has called informing you that the insurance company approved your testing, please call Central Scheduling at [phone]  Please allow our office 5 business days t

## 2020-02-19 NOTE — PROGRESS NOTES
REASON FOR VISIT:    Jackelyn Bonner is a 38year old male who presents for an [address].     HLD - pt is doing well on fenofibrate, recent labs showed elevated LDL and Non-HDL, pt has had myalgias with simvastatin in the past.  He had heart this or any previous visit. Fecal Occult Blood  Annually No results found for: FOB, OCCULTSTOOL   Obesity Screening Screen all adults annually Body mass index is 28.35 kg/m².      Preventive Services for Which Recommendations Vary with Risk Recommendation Levothyroxine Sodium 50 MCG Oral Cap Take 1 capsule by mouth daily. 90 capsule 1   • Rosuvastatin Calcium 10 MG Oral Tab Take 1 tablet (10 mg total) by mouth nightly. 90 tablet 1   • PAROXETINE HCL 20 MG Oral Tab TAKE 1 TABLET (20 MG TOTAL) BY MOUTH DAILY. denies blurred vision or double vision  HEENT: denies nasal congestion, sinus pain or ST  LUNGS: denies shortness of breath with exertion  CARDIOVASCULAR: denies chest pain on exertion  GI: denies abdominal pain, denies heartburn  : denies nocturia or ch Future    Anxiety  Chronic, stable on Paxil  We will continue    Mixed hyperlipidemia  Continue fenofibrate  Has had history of myalgias to simvastatin in the past  We will begin rosuvastatin 10 mg, discussed side effects and adverse effects of medication

## 2020-02-19 NOTE — PROGRESS NOTES
Discussed results with pt at [address] [place] [address] Rd, verbalized understanding, all questions were answered

## 2020-03-26 RX ORDER — PAROXETINE HYDROCHLORIDE 20 MG/1
20 TABLET, FILM COATED ORAL DAILY
Qty: 90 TABLET | Refills: 0 | Status: SHIPPED | OUTPATIENT
Start: 2020-03-26 | End: 2020-06-19

## 2020-03-27 RX ORDER — LISINOPRIL 40 MG/1
40 TABLET ORAL DAILY
Qty: 90 TABLET | Refills: 1 | Status: SHIPPED | OUTPATIENT
Start: 2020-03-27 | End: 2020-09-14

## 2020-03-27 NOTE — TELEPHONE ENCOUNTER
Requested Prescriptions     Pending Prescriptions Disp Refills   • LISINOPRIL 40 MG Oral Tab [Pharmacy Med Name: LISINOPRIL 40 MG TABLET] 90 tablet 0     Sig: TAKE 1 TABLET (40 MG TOTAL) BY MOUTH DAILY.  MUST HAVE LABS AND APPT FOR FURTHER 30 DAY ONLY     L

## 2020-06-19 NOTE — TELEPHONE ENCOUNTER
Requesting PAROXETINE HCL 20 MG   LOV: 2/19/20  RTC:   Last Relevant Labs: 2/18/20   Filled: 3/26/20 #90 with 0 refills    No future appointments.

## 2020-06-21 RX ORDER — PAROXETINE HYDROCHLORIDE 20 MG/1
20 TABLET, FILM COATED ORAL DAILY
Qty: 90 TABLET | Refills: 0 | Status: SHIPPED | OUTPATIENT
Start: 2020-06-21 | End: 2020-09-18

## 2020-06-30 DIAGNOSIS — Z78.9 PARTICIPANT IN HEALTH AND WELLNESS PLAN: Primary | ICD-10-CM

## 2020-08-11 RX ORDER — ROSUVASTATIN CALCIUM 10 MG/1
TABLET, COATED ORAL
Qty: 90 TABLET | Refills: 1 | Status: SHIPPED | OUTPATIENT
Start: 2020-08-11 | End: 2020-08-18

## 2020-08-11 RX ORDER — LEVOTHYROXINE SODIUM 50 UG/1
CAPSULE ORAL
Qty: 90 CAPSULE | Refills: 1 | Status: SHIPPED | OUTPATIENT
Start: 2020-08-11 | End: 2020-08-18

## 2020-08-11 NOTE — TELEPHONE ENCOUNTER
ROSUVASTATIN CALCIUM 10 MG TAB          Will file in chart as: ROSUVASTATIN CALCIUM 10 MG Oral Tab         Sig: TAKE 1 TABLET BY MOUTH EVERY DAY AT NIGHT    Disp:  90 tablet    Refills:  1    Start: 8/11/2020    Class: Normal    Non-formulary    Last order

## 2020-08-11 NOTE — TELEPHONE ENCOUNTER
Requested Prescriptions      Name from pharmacy: LEVOTHYROXINE 50 MCG TABLET         Will file in chart as: LEVOTHYROXINE SODIUM 50 MCG Oral Cap         Sig: TAKE 1 TABLET BY MOUTH EVERY DAY    Disp:  90 capsule (Pharmacy requested: 90 tablet)    Refills:

## 2020-08-13 RX ORDER — FENOFIBRATE 145 MG/1
TABLET, COATED ORAL
Qty: 90 TABLET | Refills: 0 | Status: SHIPPED | OUTPATIENT
Start: 2020-08-13 | End: 2020-11-18

## 2020-08-13 NOTE — TELEPHONE ENCOUNTER
Requested Prescriptions      Name from pharmacy: FENOFIBRATE 145 MG TABLET         Will file in chart as: FENOFIBRATE 145 MG Oral Tab         Sig: TAKE 1 TABLET BY MOUTH EVERY DAY    Disp:  90 tablet    Refills:  3    Start: 8/13/2020    Class: Normal    N

## 2020-08-14 RX ORDER — ROSUVASTATIN CALCIUM 10 MG/1
TABLET, COATED ORAL
Qty: 90 TABLET | Refills: 1 | OUTPATIENT
Start: 2020-08-14

## 2020-08-14 NOTE — TELEPHONE ENCOUNTER
Requested Prescriptions     Pending Prescriptions Disp Refills   • ROSUVASTATIN CALCIUM 10 MG Oral Tab [Pharmacy Med Name: ROSUVASTATIN CALCIUM 10 MG TAB] 90 tablet 1     Sig: TAKE 1 TABLET BY MOUTH EVERY DAY AT NIGHT       LOV: 2/19/2020  RTC: 6 months  L

## 2020-08-18 ENCOUNTER — PATIENT MESSAGE (OUTPATIENT)
Dept: FAMILY MEDICINE CLINIC | Facility: CLINIC | Age: 45
End: 2020-08-18

## 2020-08-18 ENCOUNTER — TELEPHONE (OUTPATIENT)
Dept: FAMILY MEDICINE CLINIC | Facility: CLINIC | Age: 45
End: 2020-08-18

## 2020-08-18 RX ORDER — ROSUVASTATIN CALCIUM 10 MG/1
10 TABLET, COATED ORAL NIGHTLY
Qty: 90 TABLET | Refills: 1 | Status: SHIPPED | OUTPATIENT
Start: 2020-08-18 | End: 2021-02-09

## 2020-08-18 RX ORDER — LEVOTHYROXINE SODIUM 50 UG/1
1 CAPSULE ORAL DAILY
Qty: 90 CAPSULE | Refills: 1 | Status: SHIPPED | OUTPATIENT
Start: 2020-08-18 | End: 2021-02-09

## 2020-08-18 NOTE — TELEPHONE ENCOUNTER
It shows that rosuvastatin 10mg and levothyroxine was sent on 8/11/20, however patient is stating it was never received. Please approve if ok to refill.  Thank you

## 2020-08-18 NOTE — TELEPHONE ENCOUNTER
Medications that are being requested were sent to [facility] on 8/11/20.  Sent mychart to patient notifying him

## 2020-08-18 NOTE — TELEPHONE ENCOUNTER
Patient will like temp refill on Rosuvastatin and Synthroid. Has scheduled follow up appointment with provider.

## 2020-08-18 NOTE — TELEPHONE ENCOUNTER
From: Nicolás Braswell  To: Bismark Crespo  Sent: 8/18/2020 11:23 AM CDT  Subject: medication refill    Robinson Kelley,    The medications that you are requesting were sent to [facility] on 8/11/20. If you have any questions, please call our office at [phone].     Angel Ventura

## 2020-09-02 ENCOUNTER — OFFICE VISIT (OUTPATIENT)
Dept: FAMILY MEDICINE CLINIC | Facility: CLINIC | Age: 45
End: 2020-09-02
Payer: COMMERCIAL

## 2020-09-02 VITALS
OXYGEN SATURATION: 97 % | WEIGHT: 197 LBS | TEMPERATURE: 97 F | DIASTOLIC BLOOD PRESSURE: 78 MMHG | HEART RATE: 70 BPM | RESPIRATION RATE: 16 BRPM | HEIGHT: 69 IN | BODY MASS INDEX: 29.18 KG/M2 | SYSTOLIC BLOOD PRESSURE: 122 MMHG

## 2020-09-02 DIAGNOSIS — E78.2 MIXED HYPERLIPIDEMIA: ICD-10-CM

## 2020-09-02 DIAGNOSIS — E03.9 HYPOTHYROIDISM, UNSPECIFIED TYPE: Primary | ICD-10-CM

## 2020-09-02 DIAGNOSIS — F41.9 ANXIETY: ICD-10-CM

## 2020-09-02 DIAGNOSIS — I10 ESSENTIAL HYPERTENSION: ICD-10-CM

## 2020-09-02 PROCEDURE — 3008F BODY MASS INDEX DOCD: CPT | Performed by: PHYSICIAN ASSISTANT

## 2020-09-02 PROCEDURE — 3074F SYST BP LT 130 MM HG: CPT | Performed by: PHYSICIAN ASSISTANT

## 2020-09-02 PROCEDURE — 99214 OFFICE O/P EST MOD 30 MIN: CPT | Performed by: PHYSICIAN ASSISTANT

## 2020-09-02 PROCEDURE — 3078F DIAST BP <80 MM HG: CPT | Performed by: PHYSICIAN ASSISTANT

## 2020-09-02 NOTE — PROGRESS NOTES
[facility] Progress Note    CC:  Patient presents with: Follow - Up      HPI:   HPI   Patient is a 64-year-old male here to follow-up on his hypothyroidism, hyperlipidemia, hypertension, and anxiety.   At last office visit his l chills and fever. Respiratory: Negative for cough and shortness of breath. Cardiovascular: Negative for chest pain. Gastrointestinal: Negative for nausea and vomiting. Musculoskeletal: Positive for myalgias.    Neurological: Negative for dizziness, management    Anxiety  Chronic, stable  Continue with present management    RTC 6 months, sooner if problems    Orders Placed This Encounter      Comp Metabolic Panel (14) [E]      Lipid Panel [E]      TSH and Free T4 [E]      Meds & Refills for this Visit

## 2020-09-14 RX ORDER — LISINOPRIL 40 MG/1
TABLET ORAL
Qty: 90 TABLET | Refills: 1 | Status: SHIPPED | OUTPATIENT
Start: 2020-09-14 | End: 2021-04-02

## 2020-09-14 NOTE — TELEPHONE ENCOUNTER
LISINOPRIL 40 MG TABLET          Will file in chart as: LISINOPRIL 40 MG Oral Tab         Sig: TAKE 1 TABLET BY MOUTH EVERY DAY    Disp:  90 tablet    Refills:  1    Start: 9/13/2020    Class: Normal    Non-formulary    Last ordered: 5 months ago by Lc Lemos

## 2020-09-18 RX ORDER — PAROXETINE HYDROCHLORIDE 20 MG/1
TABLET, FILM COATED ORAL
Qty: 90 TABLET | Refills: 3 | Status: SHIPPED | OUTPATIENT
Start: 2020-09-18 | End: 2021-09-21

## 2020-09-26 ENCOUNTER — TRANSCRIPTION ENCOUNTER (OUTPATIENT)
Age: 45
End: 2020-09-26

## 2020-11-18 RX ORDER — FENOFIBRATE 145 MG/1
TABLET, COATED ORAL
Qty: 90 TABLET | Refills: 0 | Status: SHIPPED | OUTPATIENT
Start: 2020-11-18 | End: 2021-02-12

## 2020-11-18 NOTE — TELEPHONE ENCOUNTER
Requesting Fenofibrate 145mg  LOV: 9/2/2020  RTC: 6 months  Last Relevant Labs: 2/18/2020  Filled: 8/13/2020 #90 with 0 refills    No future appointments.     Rx sent to pharmacy per protocol

## 2020-12-20 ENCOUNTER — IMMUNIZATION (OUTPATIENT)
Dept: LAB | Facility: HOSPITAL | Age: 45
End: 2020-12-20
Attending: PREVENTIVE MEDICINE
Payer: COMMERCIAL

## 2020-12-20 DIAGNOSIS — Z23 NEED FOR VACCINATION: ICD-10-CM

## 2020-12-20 PROCEDURE — 0001A PFIZER-BIONTECH COVID-19 VACCINE: CPT

## 2020-12-22 ENCOUNTER — APPOINTMENT (OUTPATIENT)
Dept: FAMILY MEDICINE | Facility: CLINIC | Age: 45
End: 2020-12-22
Payer: COMMERCIAL

## 2020-12-22 ENCOUNTER — NON-APPOINTMENT (OUTPATIENT)
Age: 45
End: 2020-12-22

## 2020-12-22 VITALS
WEIGHT: 216 LBS | TEMPERATURE: 98.1 F | HEART RATE: 63 BPM | BODY MASS INDEX: 26.86 KG/M2 | HEIGHT: 75 IN | SYSTOLIC BLOOD PRESSURE: 151 MMHG | DIASTOLIC BLOOD PRESSURE: 91 MMHG | OXYGEN SATURATION: 100 %

## 2020-12-22 VITALS — DIASTOLIC BLOOD PRESSURE: 100 MMHG | SYSTOLIC BLOOD PRESSURE: 140 MMHG

## 2020-12-22 DIAGNOSIS — N52.9 MALE ERECTILE DYSFUNCTION, UNSPECIFIED: ICD-10-CM

## 2020-12-22 LAB
BILIRUB UR QL STRIP: NORMAL
CLARITY UR: CLEAR
COLLECTION METHOD: NORMAL
GLUCOSE UR-MCNC: NORMAL
HCG UR QL: 0.2 EU/DL
HGB UR QL STRIP.AUTO: NORMAL
KETONES UR-MCNC: NORMAL
LEUKOCYTE ESTERASE UR QL STRIP: NORMAL
NITRITE UR QL STRIP: NORMAL
PH UR STRIP: 7
PROT UR STRIP-MCNC: NORMAL
SP GR UR STRIP: 1.02

## 2020-12-22 PROCEDURE — 99173 VISUAL ACUITY SCREEN: CPT

## 2020-12-22 PROCEDURE — 99386 PREV VISIT NEW AGE 40-64: CPT | Mod: 25

## 2020-12-22 PROCEDURE — 36415 COLL VENOUS BLD VENIPUNCTURE: CPT

## 2020-12-22 PROCEDURE — 99072 ADDL SUPL MATRL&STAF TM PHE: CPT

## 2020-12-22 PROCEDURE — 81003 URINALYSIS AUTO W/O SCOPE: CPT | Mod: QW

## 2020-12-22 PROCEDURE — 92551 PURE TONE HEARING TEST AIR: CPT

## 2020-12-22 PROCEDURE — 93000 ELECTROCARDIOGRAM COMPLETE: CPT

## 2020-12-22 RX ORDER — SILDENAFIL 50 MG/1
50 TABLET ORAL
Refills: 0 | Status: DISCONTINUED | COMMUNITY
End: 2020-12-22

## 2020-12-22 NOTE — PHYSICAL EXAM

## 2020-12-22 NOTE — COUNSELING
[Fall prevention counseling provided] : Fall prevention counseling provided [Behavioral health counseling provided] : Behavioral health counseling provided [Potential consequences of obesity discussed] : Potential consequences of obesity discussed [Benefits of weight loss discussed] : Benefits of weight loss discussed [Encouraged to maintain food diary] : Encouraged to maintain food diary [None] : None [Good understanding] : Patient has a good understanding of lifestyle changes and steps needed to achieve self management goal

## 2020-12-22 NOTE — HEALTH RISK ASSESSMENT
[With Patient/Caregiver] : With Patient/Caregiver [Designated Healthcare Proxy] : Designated healthcare proxy [Very Good] : very good [No falls in past year] : Patient reported no falls in the past year [0] : 2) Feeling down, depressed, or hopeless: Not at all [Date (MM/YY): ___] :  [unfilled] [Patient reported colonoscopy was normal] : Patient reported colonoscopy was normal [Takes medication as prescribed] : Patient takes medication as prescribed. [None] : None [With Family] : lives with family [Employed] : employed [College] : College [] :  [# Of Children ___] : has [unfilled] children [Sexually Active] : sexually active [Feels Safe at Home] : Feels safe at home [Fully functional (bathing, dressing, toileting, transferring, walking, feeding)] : Fully functional (bathing, dressing, toileting, transferring, walking, feeding) [Fully functional (using the telephone, shopping, preparing meals, housekeeping, doing laundry, using transportation,] : Fully functional and needs no help or supervision to perform IADLs (using the telephone, shopping, preparing meals, housekeeping, doing laundry, using transportation, managing medications and managing finances) [Smoke Detector] : smoke detector [Carbon Monoxide Detector] : carbon monoxide detector [Seat Belt] :  uses seat belt [Sunscreen] : uses sunscreen [AdvancecareDate] : 12/20 [Active tobbaco user] : Patient is not a tobacco user [Change in mental status noted] : No change in mental status noted [Language] : denies difficulty with language [Behavior] : denies difficulty with behavior [Learning/Retaining New Information] : denies difficulty learning/retaining new information [Handling Complex Tasks] : denies difficulty handling complex tasks [Spatial Ability and Orientation] : denies difficulty with spatial ability and orientation [High Risk Behavior] : no high risk behavior [Reports changes in hearing] : Reports no changes in hearing [Reports changes in vision] : Reports no changes in vision [Reports changes in dental health] : Reports no changes in dental health [Guns at Home] : no guns at home

## 2020-12-23 LAB
ALBUMIN SERPL ELPH-MCNC: 4.6 G/DL
ALP BLD-CCNC: 64 U/L
ALT SERPL-CCNC: 29 U/L
ANION GAP SERPL CALC-SCNC: 12 MMOL/L
AST SERPL-CCNC: 22 U/L
BASOPHILS # BLD AUTO: 0.07 K/UL
BASOPHILS NFR BLD AUTO: 0.9 %
BILIRUB SERPL-MCNC: 0.5 MG/DL
BUN SERPL-MCNC: 19 MG/DL
CALCIUM SERPL-MCNC: 9.5 MG/DL
CHLORIDE SERPL-SCNC: 101 MMOL/L
CO2 SERPL-SCNC: 26 MMOL/L
CREAT SERPL-MCNC: 1.09 MG/DL
EOSINOPHIL # BLD AUTO: 0.21 K/UL
EOSINOPHIL NFR BLD AUTO: 2.7 %
GLUCOSE SERPL-MCNC: 92 MG/DL
HCT VFR BLD CALC: 47.6 %
HGB BLD-MCNC: 15.4 G/DL
IMM GRANULOCYTES NFR BLD AUTO: 0.5 %
LYMPHOCYTES # BLD AUTO: 2.45 K/UL
LYMPHOCYTES NFR BLD AUTO: 31.5 %
MAN DIFF?: NORMAL
MCHC RBC-ENTMCNC: 31 PG
MCHC RBC-ENTMCNC: 32.4 GM/DL
MCV RBC AUTO: 95.8 FL
MONOCYTES # BLD AUTO: 0.79 K/UL
MONOCYTES NFR BLD AUTO: 10.1 %
NEUTROPHILS # BLD AUTO: 4.23 K/UL
NEUTROPHILS NFR BLD AUTO: 54.3 %
PLATELET # BLD AUTO: 293 K/UL
POTASSIUM SERPL-SCNC: 4.3 MMOL/L
PROT SERPL-MCNC: 7.2 G/DL
RBC # BLD: 4.97 M/UL
RBC # FLD: 12.6 %
SODIUM SERPL-SCNC: 139 MMOL/L
T3FREE SERPL-MCNC: 3.27 PG/ML
T4 FREE SERPL-MCNC: 1.2 NG/DL
TSH SERPL-ACNC: 2.42 UIU/ML
WBC # FLD AUTO: 7.79 K/UL

## 2020-12-28 LAB
CHOLEST SERPL-MCNC: 246 MG/DL
HDLC SERPL-MCNC: 48 MG/DL
LDLC SERPL CALC-MCNC: NORMAL MG/DL
NONHDLC SERPL-MCNC: 198 MG/DL
TRIGL SERPL-MCNC: 421 MG/DL

## 2020-12-31 ENCOUNTER — LABORATORY RESULT (OUTPATIENT)
Age: 45
End: 2020-12-31

## 2020-12-31 ENCOUNTER — NON-APPOINTMENT (OUTPATIENT)
Age: 45
End: 2020-12-31

## 2021-01-05 ENCOUNTER — TRANSCRIPTION ENCOUNTER (OUTPATIENT)
Age: 46
End: 2021-01-05

## 2021-01-10 ENCOUNTER — IMMUNIZATION (OUTPATIENT)
Dept: LAB | Facility: HOSPITAL | Age: 46
End: 2021-01-10
Attending: PREVENTIVE MEDICINE

## 2021-01-10 DIAGNOSIS — Z23 NEED FOR VACCINATION: ICD-10-CM

## 2021-01-10 PROCEDURE — 0002A SARSCOV2 VAC 30MCG/0.3ML IM: CPT

## 2021-02-09 RX ORDER — ROSUVASTATIN CALCIUM 10 MG/1
TABLET, COATED ORAL
Qty: 90 TABLET | Refills: 1 | Status: SHIPPED | OUTPATIENT
Start: 2021-02-09 | End: 2021-08-10

## 2021-02-09 RX ORDER — LEVOTHYROXINE SODIUM 0.05 MG/1
TABLET ORAL
Qty: 90 TABLET | Refills: 1 | Status: SHIPPED | OUTPATIENT
Start: 2021-02-09 | End: 2021-08-10

## 2021-02-09 NOTE — TELEPHONE ENCOUNTER
Thyroid Supplements Protocol [id number]/09/2021 12:39 AM   TSH test in past 12 months Protocol Details    TSH value between 0.350 and 5.500 IU/ml     Appointment in past 12 or next 3 months      Cholesterol Medication Protocol [id number]/09/2021 12:39 AM   ALT

## 2021-02-12 RX ORDER — FENOFIBRATE 145 MG/1
TABLET, COATED ORAL
Qty: 90 TABLET | Refills: 0 | Status: SHIPPED | OUTPATIENT
Start: 2021-02-12 | End: 2021-05-12

## 2021-02-12 NOTE — TELEPHONE ENCOUNTER
Medication protocol passed:      Name from pharmacy: FENOFIBRATE 145 MG TABLET          Will file in chart as: FENOFIBRATE 145 MG Oral Tab    Sig: TAKE 1 TABLET BY MOUTH EVERY DAY    Disp:  90 tablet    Refills:  0 (Pharmacy requested: Not specified)    St

## 2021-02-16 ENCOUNTER — APPOINTMENT (OUTPATIENT)
Dept: FAMILY MEDICINE | Facility: CLINIC | Age: 46
End: 2021-02-16
Payer: COMMERCIAL

## 2021-02-16 VITALS
WEIGHT: 216 LBS | HEART RATE: 65 BPM | HEIGHT: 75 IN | DIASTOLIC BLOOD PRESSURE: 87 MMHG | BODY MASS INDEX: 26.86 KG/M2 | SYSTOLIC BLOOD PRESSURE: 124 MMHG | OXYGEN SATURATION: 97 % | TEMPERATURE: 97.8 F

## 2021-02-16 PROCEDURE — 99072 ADDL SUPL MATRL&STAF TM PHE: CPT

## 2021-02-16 PROCEDURE — 99214 OFFICE O/P EST MOD 30 MIN: CPT

## 2021-02-16 NOTE — HEALTH RISK ASSESSMENT
[Yes] : Yes [2 - 3 times a week (3 pts)] : 2 - 3  times a week (3 points) [1 or 2 (0 pts)] : 1 or 2 (0 points) [Never (0 pts)] : Never (0 points) [No] : In the past 12 months have you used drugs other than those required for medical reasons? No [0] : 2) Feeling down, depressed, or hopeless: Not at all (0) [] : No [No falls in past year] : Patient reported no falls in the past year [Audit-CScore] : 3 [TRB7Bpspc] : 0

## 2021-02-16 NOTE — HISTORY OF PRESENT ILLNESS
[Hyperlipidemia] : Hyperlipidemia [Hypertension] : Hypertension [FreeTextEntry6] : Pt is here to recheck his medication and fasting blood work. [Does not check BP] : The patient is not checking blood pressure [<140/90] : Target blood pressure is <140/90 [Near target goal] : BP near target goal [Doing Well] : Patient is doing well [Managed with medications] : managed with  medication [Moderate Intensity Therapy] : Patient is currently on moderate intensity statin  therapy

## 2021-02-17 LAB
ALBUMIN SERPL ELPH-MCNC: 4.8 G/DL
ALP BLD-CCNC: 71 U/L
ALT SERPL-CCNC: 31 U/L
ANION GAP SERPL CALC-SCNC: 16 MMOL/L
AST SERPL-CCNC: 23 U/L
BILIRUB SERPL-MCNC: 1.5 MG/DL
BUN SERPL-MCNC: 19 MG/DL
CALCIUM SERPL-MCNC: 9.6 MG/DL
CHLORIDE SERPL-SCNC: 101 MMOL/L
CO2 SERPL-SCNC: 21 MMOL/L
CREAT SERPL-MCNC: 1.17 MG/DL
GLUCOSE SERPL-MCNC: 93 MG/DL
POTASSIUM SERPL-SCNC: 4.3 MMOL/L
PROT SERPL-MCNC: 7.1 G/DL
SODIUM SERPL-SCNC: 138 MMOL/L

## 2021-02-18 LAB
CHOLEST SERPL-MCNC: 221 MG/DL
HDLC SERPL-MCNC: 64 MG/DL
LDLC SERPL CALC-MCNC: 111 MG/DL
NONHDLC SERPL-MCNC: 157 MG/DL
TRIGL SERPL-MCNC: 230 MG/DL

## 2021-03-08 ENCOUNTER — NON-APPOINTMENT (OUTPATIENT)
Age: 46
End: 2021-03-08

## 2021-03-27 ENCOUNTER — RX RENEWAL (OUTPATIENT)
Age: 46
End: 2021-03-27

## 2021-04-02 RX ORDER — LISINOPRIL 40 MG/1
40 TABLET ORAL DAILY
Qty: 90 TABLET | Refills: 1 | OUTPATIENT
Start: 2021-04-02

## 2021-04-02 RX ORDER — LISINOPRIL 40 MG/1
TABLET ORAL
Qty: 90 TABLET | Refills: 1 | OUTPATIENT
Start: 2021-04-02

## 2021-04-02 RX ORDER — LISINOPRIL 40 MG/1
40 TABLET ORAL DAILY
Qty: 90 TABLET | Refills: 0 | Status: SHIPPED | OUTPATIENT
Start: 2021-04-02 | End: 2021-06-23

## 2021-04-02 NOTE — TELEPHONE ENCOUNTER
Medication Quantity Refills Start End   LISINOPRIL 40 MG Oral Tab 90 tablet 1 9/14/2020      Last OV 9/2/20   No future appointments.     Hypertension Medications Protocol [id number]/02/2021 09:26 AM   CMP or BMP in past 12 months Protocol Details    Appointm

## 2021-04-02 NOTE — TELEPHONE ENCOUNTER
Agus Clements. Pt states he is out of his medication and has placed the request with the pharmacy and thru [facility] for refill last week. Pt would like medication filled today.     [facility]/PHARMACY #[id number]- [address], [address] [facility] [place] [phone], [phone]

## 2021-05-12 RX ORDER — FENOFIBRATE 145 MG/1
TABLET, COATED ORAL
Qty: 30 TABLET | Refills: 0 | Status: SHIPPED | OUTPATIENT
Start: 2021-05-12 | End: 2021-06-08

## 2021-05-12 NOTE — TELEPHONE ENCOUNTER
Future Appointments   Date Time Provider Dania Govea   6/2/2021  7:15 AM Kieran Gleason MD [facility] [address] [facility] [address]

## 2021-05-12 NOTE — TELEPHONE ENCOUNTER
Requested Prescriptions     Name from pharmacy: FENOFIBRATE 145 MG TABLET         Will file in chart as: FENOFIBRATE 145 MG Oral Tab    Sig: TAKE 1 TABLET BY MOUTH EVERY DAY    Disp:  90 tablet    Refills:  0 (Pharmacy requested: Not specified)    Start: 5

## 2021-06-02 ENCOUNTER — OFFICE VISIT (OUTPATIENT)
Dept: FAMILY MEDICINE CLINIC | Facility: CLINIC | Age: 46
End: 2021-06-02
Payer: COMMERCIAL

## 2021-06-02 VITALS
SYSTOLIC BLOOD PRESSURE: 124 MMHG | HEIGHT: 69 IN | RESPIRATION RATE: 16 BRPM | OXYGEN SATURATION: 99 % | TEMPERATURE: 97 F | HEART RATE: 64 BPM | BODY MASS INDEX: 29.33 KG/M2 | WEIGHT: 198 LBS | DIASTOLIC BLOOD PRESSURE: 72 MMHG

## 2021-06-02 DIAGNOSIS — Z00.00 WELLNESS EXAMINATION: Primary | ICD-10-CM

## 2021-06-02 DIAGNOSIS — Z00.00 LABORATORY EXAM ORDERED AS PART OF ROUTINE GENERAL MEDICAL EXAMINATION: ICD-10-CM

## 2021-06-02 PROCEDURE — 99396 PREV VISIT EST AGE 40-64: CPT | Performed by: FAMILY MEDICINE

## 2021-06-02 PROCEDURE — 3078F DIAST BP <80 MM HG: CPT | Performed by: FAMILY MEDICINE

## 2021-06-02 PROCEDURE — 3074F SYST BP LT 130 MM HG: CPT | Performed by: FAMILY MEDICINE

## 2021-06-02 PROCEDURE — 3008F BODY MASS INDEX DOCD: CPT | Performed by: FAMILY MEDICINE

## 2021-06-02 NOTE — PATIENT INSTRUCTIONS
Thank you for choosing Conner Thomason MD at [place] [address]  To Do: Jaci Corderoo  1. Please see age appropriate health prevention below    [facility] is located in [address]. Monday, Tuesday & Friday – 8 a.m. to 4 p.m.   WednesdayMigel the benefits outweigh those potential risks and we strive to make you healthier and to improve your quality of life.     Referrals, and Radiology Information:    If your insurance requires a referral to a specialist, please allow 5 business days to process exams   Blood pressure All men in this age group Yearly checkup if your blood pressure reading is normal  Normal blood pressure is less than 120/80 mm Hg  If your blood pressure is higher than normal, follow the advice of your healthcare provider      Depr dose should be given at least 2 months after the second dose and at least 4 months after the first dose   Haemophilus influenzae Type B (HIB) Men at increased risk for infection – talk with your healthcare provider 1 to 3 doses   Influenza (flu) All men in

## 2021-06-02 NOTE — PROGRESS NOTES
Wellness Exam    REASON FOR VISIT:    Philip Khan is a 40year old male who presents for an [address]     Current Complaints: Dr. Arabella Squires is here for his wellness exam  Flu Shot: see immunization record  Health Maintenance Topics with piper Little interest or pleasure in doing things (over the last two weeks)?: Not at all    Feeling down, depressed, or hopeless (over the last two weeks)?: Not at all    PHQ-2 SCORE: 0              PREVENTATIVE SERVICES  INDICATIONS AND SCHEDULE Recommendatio LEVOTHYROXINE SODIUM 50 MCG Oral Tab TAKE 1 TABLET BY MOUTH EVERY DAY 90 tablet 1   • ROSUVASTATIN CALCIUM 10 MG Oral Tab TAKE 1 TABLET BY MOUTH EVERY DAY AT NIGHT 90 tablet 1   • PAROXETINE HCL 20 MG Oral Tab TAKE 1 TABLET BY MOUTH EVERY DAY 90 tablet 3 urgency, frequency and difficulty urinating. Musculoskeletal: Negative for arthralgias and no gait problem. +back pain  Skin: Negative for color change and rash. Neurological: Negative for tremors, weakness +occasional numbness RLE.    Hematological: Ne cancer screening, labs, safety, immunizations were discussed with the patient and ordered as follows:  Diagnoses and all orders for this visit:    Wellness examination    Laboratory exam ordered as part of routine general medical examination  -     CBC WIT Anxiety state     Essential hypertension     Renal artery stenosis (HCC)     Herniated lumbar intervertebral disc     Lumbar radiculopathy     Foot drop, right     Calcified granuloma of lung ([facility] [address]     Anxiety    PREVENTIVE VISIT,EST,40-64

## 2021-06-08 RX ORDER — FENOFIBRATE 145 MG/1
TABLET, COATED ORAL
Qty: 90 TABLET | Refills: 1 | Status: SHIPPED | OUTPATIENT
Start: 2021-06-08 | End: 2021-12-15

## 2021-06-08 NOTE — TELEPHONE ENCOUNTER
Cholesterol Medication Protocol [id number]/08/2021 09:30 AM   ALT < 80 Protocol Details    ALT resulted within past year     Lipid panel within past 12 months     Appointment within past 12 or next 3 months      Refill protocol failed because the patient did

## 2021-06-23 RX ORDER — LISINOPRIL 40 MG/1
TABLET ORAL
Qty: 90 TABLET | Refills: 1 | Status: SHIPPED | OUTPATIENT
Start: 2021-06-23 | End: 2021-12-30

## 2021-06-23 NOTE — TELEPHONE ENCOUNTER
Hypertension Medications Protocol [id number]/23/2021 12:18 AM   CMP or BMP in past 12 months Protocol Details    Last serum creatinine< 2.0     Appointment in past 6 or next 3 months      Refill protocol failed because the patient did not meet the protocol c

## 2021-06-29 RX ORDER — LISINOPRIL 40 MG/1
40 TABLET ORAL DAILY
Qty: 90 TABLET | Refills: 1 | Status: CANCELLED | OUTPATIENT
Start: 2021-06-29

## 2021-06-29 NOTE — TELEPHONE ENCOUNTER
Requested Prescriptions     lisinopril 40 MG Oral Tab         Sig: Take 1 tablet (40 mg total) by mouth daily.     Disp:  90 tablet    Refills:  1    Start: 6/29/2021    Class: Normal    Non-formulary    Last ordered: 6 days ago by Conner Thomason MD     [facility]

## 2021-06-30 ENCOUNTER — RX RENEWAL (OUTPATIENT)
Age: 46
End: 2021-06-30

## 2021-07-01 ENCOUNTER — RX RENEWAL (OUTPATIENT)
Age: 46
End: 2021-07-01

## 2021-07-12 ENCOUNTER — NON-APPOINTMENT (OUTPATIENT)
Age: 46
End: 2021-07-12

## 2021-07-12 ENCOUNTER — APPOINTMENT (OUTPATIENT)
Dept: FAMILY MEDICINE | Facility: CLINIC | Age: 46
End: 2021-07-12
Payer: COMMERCIAL

## 2021-07-12 VITALS
SYSTOLIC BLOOD PRESSURE: 126 MMHG | BODY MASS INDEX: 26.11 KG/M2 | WEIGHT: 210 LBS | DIASTOLIC BLOOD PRESSURE: 84 MMHG | HEIGHT: 75 IN

## 2021-07-12 PROCEDURE — 99213 OFFICE O/P EST LOW 20 MIN: CPT

## 2021-07-12 PROCEDURE — 99072 ADDL SUPL MATRL&STAF TM PHE: CPT

## 2021-07-12 RX ORDER — ATORVASTATIN CALCIUM 10 MG/1
10 TABLET, FILM COATED ORAL
Refills: 0 | Status: DISCONTINUED | COMMUNITY
End: 2021-07-12

## 2021-07-12 NOTE — HISTORY OF PRESENT ILLNESS
[Hyperlipidemia] : Hyperlipidemia [Hypertension] : Hypertension [Does not check BP] : The patient is not checking blood pressure [<140/90] : Target blood pressure is <140/90 [At intermediate goal] : BP at intermediate goal

## 2021-08-10 RX ORDER — LEVOTHYROXINE SODIUM 0.05 MG/1
TABLET ORAL
Qty: 90 TABLET | Refills: 1 | Status: SHIPPED | OUTPATIENT
Start: 2021-08-10 | End: 2022-02-04

## 2021-08-10 RX ORDER — ROSUVASTATIN CALCIUM 10 MG/1
TABLET, COATED ORAL
Qty: 90 TABLET | Refills: 1 | Status: SHIPPED | OUTPATIENT
Start: 2021-08-10 | End: 2022-02-04

## 2021-08-10 NOTE — TELEPHONE ENCOUNTER
Thyroid Supplements Protocol Mlldvz89/10/2021 12:27 AM   TSH test in past 12 months Protocol Details    TSH value between 0.350 and 5.500 IU/ml     Appointment in past 12 or next 3 months      Refill protocol failed because the patient did not meet the pro

## 2021-09-21 RX ORDER — PAROXETINE HYDROCHLORIDE 20 MG/1
TABLET, FILM COATED ORAL
Qty: 90 TABLET | Refills: 3 | Status: SHIPPED | OUTPATIENT
Start: 2021-09-21

## 2021-09-21 NOTE — TELEPHONE ENCOUNTER
Requested Prescriptions     Pending Prescriptions Disp Refills   • PAROXETINE 20 MG Oral Tab [Pharmacy Med Name: PAROXETINE HCL 20 MG TABLET] 90 tablet 3     Sig: TAKE 1 TABLET BY MOUTH EVERY DAY     LOV: 6/2/21  RTC:   Last Relevant Labs: lab orders pende

## 2021-10-17 ENCOUNTER — RX RENEWAL (OUTPATIENT)
Age: 46
End: 2021-10-17

## 2021-10-25 ENCOUNTER — APPOINTMENT (OUTPATIENT)
Dept: FAMILY MEDICINE | Facility: CLINIC | Age: 46
End: 2021-10-25
Payer: COMMERCIAL

## 2021-10-25 VITALS
HEIGHT: 75 IN | HEART RATE: 88 BPM | DIASTOLIC BLOOD PRESSURE: 88 MMHG | OXYGEN SATURATION: 97 % | SYSTOLIC BLOOD PRESSURE: 130 MMHG | BODY MASS INDEX: 26.11 KG/M2 | TEMPERATURE: 98.7 F | WEIGHT: 210 LBS

## 2021-10-25 PROCEDURE — 99213 OFFICE O/P EST LOW 20 MIN: CPT

## 2021-10-25 NOTE — HISTORY OF PRESENT ILLNESS
[Hypertension] : Hypertension [Does not check BP] : The patient is not checking blood pressure [<140/90] : Target blood pressure is <140/90 [Target goal met] : BP target goal met [FreeTextEntry6] : Pt is here for BP check.

## 2021-10-25 NOTE — HEALTH RISK ASSESSMENT
[No] : No [No falls in past year] : Patient reported no falls in the past year [0] : 2) Feeling down, depressed, or hopeless: Not at all (0) [Patient refused screening] : Patient refused screening [PHQ-2 Negative - No further assessment needed] : PHQ-2 Negative - No further assessment needed [[id number]] : 0

## 2021-11-28 ENCOUNTER — TELEPHONE (OUTPATIENT)
Dept: INTERNAL MEDICINE CLINIC | Facility: HOSPITAL | Age: 46
End: 2021-11-28

## 2021-11-28 DIAGNOSIS — Z20.822 SUSPECTED COVID-19 VIRUS INFECTION: Primary | ICD-10-CM

## 2021-11-28 NOTE — TELEPHONE ENCOUNTER
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 days  When was the last shift you worked?  11/26/21  When are you next scheduled to work? 11/29/21    Did you have close contact with someone on your unit while not wearing a mask? (e.g., during [facility] in past 3 months: Remove from work. Order Alinity testing at                                       least 5 days from exposure. If negative may return to work after 7 days from exposure date (on day 8 after exposure).

## 2021-11-29 NOTE — TELEPHONE ENCOUNTER
Results and RTW guidelines:    COVID RESULT:    [] Viewed by employee in [address] RTW plan and instructions as indicated on triage call. Manager notified. Estimated RTW date:   [] Discussed with employee   [x] Unable to reach by phone.   Sent via [employer] communication open with management about RTW and if symptoms worsen                - If outside testing completed, bring a copy of result to RTW appointment      Notes: My Chart message sent. Requested to call back with any covid Sx, stay home and call if c

## 2021-12-15 RX ORDER — FENOFIBRATE 145 MG/1
TABLET, COATED ORAL
Qty: 90 TABLET | Refills: 1 | Status: SHIPPED | OUTPATIENT
Start: 2021-12-15

## 2021-12-30 RX ORDER — LISINOPRIL 40 MG/1
TABLET ORAL
Qty: 90 TABLET | Refills: 1 | Status: SHIPPED | OUTPATIENT
Start: 2021-12-30

## 2022-02-04 RX ORDER — LEVOTHYROXINE SODIUM 0.05 MG/1
TABLET ORAL
Qty: 90 TABLET | Refills: 0 | Status: SHIPPED | OUTPATIENT
Start: 2022-02-04

## 2022-02-04 RX ORDER — ROSUVASTATIN CALCIUM 10 MG/1
TABLET, COATED ORAL
Qty: 90 TABLET | Refills: 0 | Status: SHIPPED | OUTPATIENT
Start: 2022-02-04

## 2022-03-25 ENCOUNTER — APPOINTMENT (OUTPATIENT)
Dept: FAMILY MEDICINE | Facility: CLINIC | Age: 47
End: 2022-03-25
Payer: COMMERCIAL

## 2022-03-25 ENCOUNTER — NON-APPOINTMENT (OUTPATIENT)
Age: 47
End: 2022-03-25

## 2022-03-25 VITALS
DIASTOLIC BLOOD PRESSURE: 88 MMHG | HEART RATE: 71 BPM | SYSTOLIC BLOOD PRESSURE: 135 MMHG | BODY MASS INDEX: 26.24 KG/M2 | TEMPERATURE: 97.6 F | OXYGEN SATURATION: 99 % | WEIGHT: 211 LBS | HEIGHT: 75 IN

## 2022-03-25 VITALS — DIASTOLIC BLOOD PRESSURE: 80 MMHG | SYSTOLIC BLOOD PRESSURE: 118 MMHG

## 2022-03-25 LAB
BILIRUB UR QL STRIP: NORMAL
CLARITY UR: CLEAR
COLLECTION METHOD: NORMAL
GLUCOSE UR-MCNC: NORMAL
HCG UR QL: 0.2 EU/DL
HGB UR QL STRIP.AUTO: ABNORMAL
KETONES UR-MCNC: ABNORMAL
LEUKOCYTE ESTERASE UR QL STRIP: NORMAL
NITRITE UR QL STRIP: NORMAL
PH UR STRIP: 5.5
PROT UR STRIP-MCNC: NORMAL
SP GR UR STRIP: 1.03

## 2022-03-25 PROCEDURE — 93000 ELECTROCARDIOGRAM COMPLETE: CPT

## 2022-03-25 PROCEDURE — 99396 PREV VISIT EST AGE 40-64: CPT | Mod: 25

## 2022-03-25 PROCEDURE — 99173 VISUAL ACUITY SCREEN: CPT

## 2022-03-25 PROCEDURE — 92551 PURE TONE HEARING TEST AIR: CPT

## 2022-03-25 PROCEDURE — 36415 COLL VENOUS BLD VENIPUNCTURE: CPT

## 2022-03-25 PROCEDURE — 81003 URINALYSIS AUTO W/O SCOPE: CPT | Mod: QW

## 2022-03-25 NOTE — COUNSELING
[Fall prevention counseling provided] : Fall prevention counseling provided [Behavioral health counseling provided] : Behavioral health counseling provided [AUDIT-C Screening administered and reviewed] : AUDIT-C Screening administered and reviewed [Benefits of weight loss discussed] : Benefits of weight loss discussed [Encouraged to maintain food diary] : Encouraged to maintain food diary [Encouraged to increase physical activity] : Encouraged to increase physical activity [FreeTextEntry2] : See above. Diet rich in carbohydrates, vegetables, lean meats

## 2022-03-25 NOTE — HISTORY OF PRESENT ILLNESS
[FreeTextEntry1] : Pt is here for his CPE. [de-identified] : Pt states he is feeling well; has no complaints

## 2022-03-25 NOTE — HEALTH RISK ASSESSMENT
[Never] : Never [Yes] : Yes [Monthly or less (1 pt)] : Monthly or less (1 point) [1 or 2 (0 pts)] : 1 or 2 (0 points) [Never (0 pts)] : Never (0 points) [No] : In the past 12 months have you used drugs other than those required for medical reasons? No [No falls in past year] : Patient reported no falls in the past year [0] : 2) Feeling down, depressed, or hopeless: Not at all (0) [HIV test declined] : HIV test declined [Hepatitis C test declined] : Hepatitis C test declined [With Family] : lives with family [# of Members in Household ___] :  household currently consist of [unfilled] member(s) [Employed] : employed [College] : College [] :  [# Of Children ___] : has [unfilled] children [Sexually Active] : sexually active [Feels Safe at Home] : Feels safe at home [Fully functional (bathing, dressing, toileting, transferring, walking, feeding)] : Fully functional (bathing, dressing, toileting, transferring, walking, feeding) [Fully functional (using the telephone, shopping, preparing meals, housekeeping, doing laundry, using] : Fully functional and needs no help or supervision to perform IADLs (using the telephone, shopping, preparing meals, housekeeping, doing laundry, using transportation, managing medications and managing finances) [Reports normal functional visual acuity (ie: able to read med bottle)] : Reports normal functional visual acuity [Smoke Detector] : smoke detector [Carbon Monoxide Detector] : carbon monoxide detector [Safety elements used in home] : safety elements used in home [Seat Belt] :  uses seat belt [Sunscreen] : uses sunscreen [Travel to Developing Areas] : travel to developing areas [Good] : ~his/her~  mood as  good [Patient reported colonoscopy was normal] : Patient reported colonoscopy was normal [FreeTextEntry1] : High blood pressure [Audit-CScore] : 1 [de-identified] : Exercises regularly, 5x a week. Weight training and cardio (bike)  [de-identified] : Lunch: salad, dinner: chicken/broccoli  [LYP4Donjo] : 0 [Change in mental status noted] : No change in mental status noted [Language] : denies difficulty with language [Behavior] : denies difficulty with behavior [Learning/Retaining New Information] : denies difficulty learning/retaining new information [Handling Complex Tasks] : denies difficulty handling complex tasks [Reasoning] : denies difficulty with reasoning [Spatial Ability and Orientation] : denies difficulty with spatial ability and orientation [Reports changes in hearing] : Reports no changes in hearing [Reports changes in vision] : Reports no changes in vision [Reports changes in dental health] : Reports no changes in dental health [Guns at Home] : no guns at home [TB Exposure] : is not being exposed to tuberculosis [Caregiver Concerns] : does not have caregiver concerns [ColonoscopyDate] : 02/2020 [FreeTextEntry2] : sales

## 2022-03-25 NOTE — PHYSICAL EXAM
[20/___] : left eye 20/[unfilled] [Fundoscopic Exam Performed] : fundoscopic ~T exam ~C was performed [Normal TMs] : both tympanic membranes were normal [Normal Nasal Mucosa] : the nasal mucosa was normal [Normal] : affect was normal and insight and judgment were intact [de-identified] : Erythematous L. TM and canal [FreeTextEntry1] : Right Ear\par \par 0.5-45\par 1-35\par 2-25\par 4-25\par \par Left Ear\par \par 0.5-40\par 1-30\par 2-20\par 4-15\par

## 2022-03-26 LAB
ALBUMIN SERPL ELPH-MCNC: 4.6 G/DL
ALP BLD-CCNC: 63 U/L
ALT SERPL-CCNC: 20 U/L
ANION GAP SERPL CALC-SCNC: 15 MMOL/L
AST SERPL-CCNC: 16 U/L
BASOPHILS # BLD AUTO: 0.06 K/UL
BASOPHILS NFR BLD AUTO: 1.1 %
BILIRUB SERPL-MCNC: 1.1 MG/DL
BUN SERPL-MCNC: 21 MG/DL
CALCIUM SERPL-MCNC: 9.1 MG/DL
CHLORIDE SERPL-SCNC: 101 MMOL/L
CHOLEST SERPL-MCNC: 177 MG/DL
CO2 SERPL-SCNC: 22 MMOL/L
CREAT SERPL-MCNC: 1.21 MG/DL
EGFR: 75 ML/MIN/1.73M2
EOSINOPHIL # BLD AUTO: 0.12 K/UL
EOSINOPHIL NFR BLD AUTO: 2.2 %
GLUCOSE SERPL-MCNC: 87 MG/DL
HCT VFR BLD CALC: 47 %
HDLC SERPL-MCNC: 41 MG/DL
HGB BLD-MCNC: 15.9 G/DL
IMM GRANULOCYTES NFR BLD AUTO: 0.2 %
LDLC SERPL CALC-MCNC: 106 MG/DL
LYMPHOCYTES # BLD AUTO: 1.74 K/UL
LYMPHOCYTES NFR BLD AUTO: 32.1 %
MAN DIFF?: NORMAL
MCHC RBC-ENTMCNC: 31.1 PG
MCHC RBC-ENTMCNC: 33.8 GM/DL
MCV RBC AUTO: 91.8 FL
MONOCYTES # BLD AUTO: 0.52 K/UL
MONOCYTES NFR BLD AUTO: 9.6 %
NEUTROPHILS # BLD AUTO: 2.97 K/UL
NEUTROPHILS NFR BLD AUTO: 54.8 %
NONHDLC SERPL-MCNC: 136 MG/DL
PLATELET # BLD AUTO: 230 K/UL
POTASSIUM SERPL-SCNC: 4.1 MMOL/L
PROT SERPL-MCNC: 6.9 G/DL
RBC # BLD: 5.12 M/UL
RBC # FLD: 12.5 %
SODIUM SERPL-SCNC: 137 MMOL/L
T3FREE SERPL-MCNC: 2.91 PG/ML
T4 FREE SERPL-MCNC: 1.3 NG/DL
TRIGL SERPL-MCNC: 148 MG/DL
TSH SERPL-ACNC: 1.78 UIU/ML
WBC # FLD AUTO: 5.42 K/UL

## 2022-05-07 RX ORDER — LEVOTHYROXINE SODIUM 0.05 MG/1
TABLET ORAL
Qty: 90 TABLET | Refills: 0 | Status: SHIPPED | OUTPATIENT
Start: 2022-05-07

## 2022-05-07 RX ORDER — ROSUVASTATIN CALCIUM 10 MG/1
TABLET, COATED ORAL
Qty: 90 TABLET | Refills: 0 | Status: SHIPPED | OUTPATIENT
Start: 2022-05-07

## 2022-05-18 ENCOUNTER — TELEPHONE (OUTPATIENT)
Dept: INTERNAL MEDICINE CLINIC | Facility: HOSPITAL | Age: 47
End: 2022-05-18

## 2022-05-18 ENCOUNTER — LAB ENCOUNTER (OUTPATIENT)
Dept: LAB | Facility: HOSPITAL | Age: 47
End: 2022-05-18
Attending: PREVENTIVE MEDICINE
Payer: COMMERCIAL

## 2022-05-18 DIAGNOSIS — Z20.822 SUSPECTED 2019 NOVEL CORONAVIRUS INFECTION: Primary | ICD-10-CM

## 2022-05-18 DIAGNOSIS — Z20.822 SUSPECTED 2019 NOVEL CORONAVIRUS INFECTION: ICD-10-CM

## 2022-05-18 LAB — SARS-COV-2 RNA RESP QL NAA+PROBE: DETECTED

## 2022-05-19 NOTE — TELEPHONE ENCOUNTER
Results and RTW guidelines:    COVID RESULT:    [x] Viewed by employee in [facility]. RTW plan and instructions as indicated on triage call. Manager notified. Estimated RTW date: 5/22  [x] Discussed with employee   [] Unable to reach by phone. Sent via [employer] message      Test type:    [x] Rapid         [] Alinity         [] Outside test:       [x] Positive  Is employee unvaccinated? Yes []   No [x]          If yes:  Enter Covid Positive Medical exemption on Exemption Spreadsheet    Did you have close contact with someone on your unit while not wearing a mask? (e.g., during meal breaks):  Yes []   No [x]     If yes, who:     - Employee should quarantine at home for at least 5 days (day 1 is day after sx onset) , follow the CDC guidelines for cleaning and                              quarantining; see CDC.gov   -This employee may RTW on day 6 if asymptomatic or mildly symptomatic (with improving symptoms). Call Employee Health on day 5 if unable to return on day 6 after                      symptom onset.    -This employee needs to call [employer] on day 5 after symptom onset. The employee needs to be cleared by [employer]. - Monitor symptoms and temperature                 - Notify PCP of result                 - Seek emergent care with worsening symptoms   - If employee is still experiencing severe symptoms on day 5 must make a RTW appt with [employer], Employee will not be cleared if:    1. Has consistent cough, shortness of breath or fatigue that restricts your physical activities    2. Is still feeling \"unwell\"    3. Within 15 days of hospitalization for COVID    4. Within 20 days of intubation for COVID    5.  Still has a fever, vomiting or diarrhea   - Keep communication open with management about RTW and if symptoms worsen                - If outside testing completed, bring a copy of result to RTW appointment           Notes:     RTW PLAN:    [x]  If COVID positive results, off work minimum of 5 days from positive test or onset of symptoms (day 0)        On day 5, if asymptomatic or mildly symptomatic (with improving symptoms) may return to work day 6          On day 5, if symptomatic, call Employee Health for RTW screening        [x]  COVID positive result - call Employee Health on day 5 after symptom onset. The employee needs to be cleared by Employee Health to RTW. [] RTW immediately, continue to monitor for sx  [] RTW when sx improve; must be fever free for 24 hours w/o medications, Diarrhea/Vomiting free for 24 hours w/o medications  [] Alinity ordered; continue to monitor sx and call for new/worsening sx.   Discuss RTW guidelines with manager  [] May continue to work  [x] Follow up with PCP  [] Home until further instruction from hotline with Alinity results  INSTRUCTIONS PROVIDED:  [x]  Plan as noted above  []  Length of time to obtain results   [x]  Quarantine instructions  [x]  Masking protocol   []  S/S of worsening infection/condition and importance of prompt medical re-evaluation including when to seek emergency care  [] If symptoms develop, stay home and call hotline for rapid test order    Estimated RTW date:  05/22    [x] The employee voiced understanding of above plan/instructions  [x] Manager Notified

## 2022-06-12 RX ORDER — FENOFIBRATE 145 MG/1
TABLET, COATED ORAL
Qty: 90 TABLET | Refills: 1 | Status: SHIPPED | OUTPATIENT
Start: 2022-06-12

## 2022-06-21 RX ORDER — LISINOPRIL 40 MG/1
TABLET ORAL
Qty: 90 TABLET | Refills: 1 | Status: SHIPPED | OUTPATIENT
Start: 2022-06-21

## 2022-06-21 NOTE — TELEPHONE ENCOUNTER
Future Appointments   Date Time Provider Dania Xuan   6/30/2022  9:00 AM Blake Sharp MD [facility] [address]

## 2022-06-30 ENCOUNTER — OFFICE VISIT (OUTPATIENT)
Dept: FAMILY MEDICINE CLINIC | Facility: CLINIC | Age: 47
End: 2022-06-30
Payer: COMMERCIAL

## 2022-06-30 ENCOUNTER — LAB ENCOUNTER (OUTPATIENT)
Dept: LAB | Age: 47
End: 2022-06-30
Attending: FAMILY MEDICINE
Payer: COMMERCIAL

## 2022-06-30 VITALS
DIASTOLIC BLOOD PRESSURE: 78 MMHG | OXYGEN SATURATION: 96 % | BODY MASS INDEX: 30.25 KG/M2 | SYSTOLIC BLOOD PRESSURE: 138 MMHG | HEIGHT: 69 IN | HEART RATE: 60 BPM | TEMPERATURE: 98 F | WEIGHT: 204.25 LBS | RESPIRATION RATE: 16 BRPM

## 2022-06-30 DIAGNOSIS — Z12.11 COLON CANCER SCREENING: ICD-10-CM

## 2022-06-30 DIAGNOSIS — Z00.00 WELLNESS EXAMINATION: ICD-10-CM

## 2022-06-30 DIAGNOSIS — Z00.00 WELLNESS EXAMINATION: Primary | ICD-10-CM

## 2022-06-30 LAB
ALBUMIN SERPL-MCNC: 4.2 G/DL (ref 3.4–5)
ALBUMIN/GLOB SERPL: 1.4 {RATIO} (ref 1–2)
ALP LIVER SERPL-CCNC: 38 U/L
ALT SERPL-CCNC: 46 U/L
ANION GAP SERPL CALC-SCNC: 6 MMOL/L (ref 0–18)
AST SERPL-CCNC: 17 U/L (ref 15–37)
BASOPHILS # BLD AUTO: 0.03 X10(3) UL (ref 0–0.2)
BASOPHILS NFR BLD AUTO: 0.5 %
BILIRUB SERPL-MCNC: 0.5 MG/DL (ref 0.1–2)
BUN BLD-MCNC: 14 MG/DL (ref 7–18)
CALCIUM BLD-MCNC: 9.5 MG/DL (ref 8.5–10.1)
CHLORIDE SERPL-SCNC: 110 MMOL/L (ref 98–112)
CHOLEST SERPL-MCNC: 164 MG/DL (ref ?–200)
CO2 SERPL-SCNC: 26 MMOL/L (ref 21–32)
CREAT BLD-MCNC: 1.12 MG/DL
EOSINOPHIL # BLD AUTO: 0.19 X10(3) UL (ref 0–0.7)
EOSINOPHIL NFR BLD AUTO: 3.2 %
ERYTHROCYTE [DISTWIDTH] IN BLOOD BY AUTOMATED COUNT: 13 %
FASTING PATIENT LIPID ANSWER: YES
FASTING STATUS PATIENT QL REPORTED: YES
GLOBULIN PLAS-MCNC: 3.1 G/DL (ref 2.8–4.4)
GLUCOSE BLD-MCNC: 93 MG/DL (ref 70–99)
HCT VFR BLD AUTO: 47.3 %
HDLC SERPL-MCNC: 42 MG/DL (ref 40–59)
HGB BLD-MCNC: 15.6 G/DL
IMM GRANULOCYTES # BLD AUTO: 0.02 X10(3) UL (ref 0–1)
IMM GRANULOCYTES NFR BLD: 0.3 %
LDLC SERPL CALC-MCNC: 105 MG/DL (ref ?–100)
LYMPHOCYTES # BLD AUTO: 3.09 X10(3) UL (ref 1–4)
LYMPHOCYTES NFR BLD AUTO: 51.2 %
MCH RBC QN AUTO: 29.1 PG (ref 26–34)
MCHC RBC AUTO-ENTMCNC: 33 G/DL (ref 31–37)
MCV RBC AUTO: 88.2 FL
MONOCYTES # BLD AUTO: 0.42 X10(3) UL (ref 0.1–1)
MONOCYTES NFR BLD AUTO: 7 %
NEUTROPHILS # BLD AUTO: 2.28 X10 (3) UL (ref 1.5–7.7)
NEUTROPHILS # BLD AUTO: 2.28 X10(3) UL (ref 1.5–7.7)
NEUTROPHILS NFR BLD AUTO: 37.8 %
NONHDLC SERPL-MCNC: 122 MG/DL (ref ?–130)
OSMOLALITY SERPL CALC.SUM OF ELEC: 294 MOSM/KG (ref 275–295)
PLATELET # BLD AUTO: 206 10(3)UL (ref 150–450)
POTASSIUM SERPL-SCNC: 4 MMOL/L (ref 3.5–5.1)
PROT SERPL-MCNC: 7.3 G/DL (ref 6.4–8.2)
RBC # BLD AUTO: 5.36 X10(6)UL
SODIUM SERPL-SCNC: 142 MMOL/L (ref 136–145)
T4 FREE SERPL-MCNC: 0.9 NG/DL (ref 0.8–1.7)
TRIGL SERPL-MCNC: 88 MG/DL (ref 30–149)
TSI SER-ACNC: 1.41 MIU/ML (ref 0.36–3.74)
VLDLC SERPL CALC-MCNC: 15 MG/DL (ref 0–30)
WBC # BLD AUTO: 6 X10(3) UL (ref 4–11)

## 2022-06-30 PROCEDURE — 99396 PREV VISIT EST AGE 40-64: CPT | Performed by: FAMILY MEDICINE

## 2022-06-30 PROCEDURE — 84443 ASSAY THYROID STIM HORMONE: CPT

## 2022-06-30 PROCEDURE — 80053 COMPREHEN METABOLIC PANEL: CPT

## 2022-06-30 PROCEDURE — 3075F SYST BP GE 130 - 139MM HG: CPT | Performed by: FAMILY MEDICINE

## 2022-06-30 PROCEDURE — 85025 COMPLETE CBC W/AUTO DIFF WBC: CPT

## 2022-06-30 PROCEDURE — 36415 COLL VENOUS BLD VENIPUNCTURE: CPT

## 2022-06-30 PROCEDURE — 80061 LIPID PANEL: CPT

## 2022-06-30 PROCEDURE — 3078F DIAST BP <80 MM HG: CPT | Performed by: FAMILY MEDICINE

## 2022-06-30 PROCEDURE — 84439 ASSAY OF FREE THYROXINE: CPT

## 2022-06-30 PROCEDURE — 3008F BODY MASS INDEX DOCD: CPT | Performed by: FAMILY MEDICINE

## 2022-08-10 RX ORDER — LEVOTHYROXINE SODIUM 0.05 MG/1
TABLET ORAL
Qty: 90 TABLET | Refills: 0 | Status: SHIPPED | OUTPATIENT
Start: 2022-08-10

## 2022-08-10 RX ORDER — ROSUVASTATIN CALCIUM 10 MG/1
TABLET, COATED ORAL
Qty: 90 TABLET | Refills: 0 | Status: SHIPPED | OUTPATIENT
Start: 2022-08-10

## 2022-08-28 RX ORDER — PAROXETINE HYDROCHLORIDE 20 MG/1
TABLET, FILM COATED ORAL
Qty: 90 TABLET | Refills: 3 | Status: SHIPPED | OUTPATIENT
Start: 2022-08-28

## 2022-08-29 ENCOUNTER — APPOINTMENT (OUTPATIENT)
Dept: FAMILY MEDICINE | Facility: CLINIC | Age: 47
End: 2022-08-29

## 2022-08-29 VITALS
SYSTOLIC BLOOD PRESSURE: 122 MMHG | WEIGHT: 211 LBS | DIASTOLIC BLOOD PRESSURE: 82 MMHG | OXYGEN SATURATION: 98 % | BODY MASS INDEX: 26.37 KG/M2 | HEART RATE: 71 BPM | TEMPERATURE: 98.7 F

## 2022-08-29 PROCEDURE — 99214 OFFICE O/P EST MOD 30 MIN: CPT | Mod: 25

## 2022-08-29 PROCEDURE — 90686 IIV4 VACC NO PRSV 0.5 ML IM: CPT

## 2022-08-29 PROCEDURE — G0008: CPT | Mod: 59

## 2022-08-29 NOTE — HISTORY OF PRESENT ILLNESS
[Hyperlipidemia] : Hyperlipidemia [FreeTextEntry6] : pt is here for bp check. [Checks BP Sporadically] : The patient checks ~his/her~ blood pressure sporadically [<140/90] : Target blood pressure is <140/90 [Target goal met] : BP target goal met [de-identified] : 120's

## 2022-10-02 ENCOUNTER — NON-APPOINTMENT (OUTPATIENT)
Age: 47
End: 2022-10-02

## 2022-10-10 ENCOUNTER — IMMUNIZATION (OUTPATIENT)
Dept: LAB | Facility: HOSPITAL | Age: 47
End: 2022-10-10
Attending: PREVENTIVE MEDICINE

## 2022-10-10 DIAGNOSIS — Z23 NEED FOR VACCINATION: Primary | ICD-10-CM

## 2022-10-10 PROCEDURE — 90471 IMMUNIZATION ADMIN: CPT

## 2022-11-05 RX ORDER — ROSUVASTATIN CALCIUM 10 MG/1
TABLET, COATED ORAL
Qty: 90 TABLET | Refills: 0 | Status: SHIPPED | OUTPATIENT
Start: 2022-11-05

## 2022-11-05 RX ORDER — LEVOTHYROXINE SODIUM 0.05 MG/1
TABLET ORAL
Qty: 90 TABLET | Refills: 0 | Status: SHIPPED | OUTPATIENT
Start: 2022-11-05

## 2022-11-19 PROBLEM — Z12.11 SPECIAL SCREENING FOR MALIGNANT NEOPLASM OF COLON: Status: ACTIVE | Noted: 2022-11-19

## 2022-11-20 ENCOUNTER — RX RENEWAL (OUTPATIENT)
Age: 47
End: 2022-11-20

## 2022-12-15 RX ORDER — FENOFIBRATE 145 MG/1
TABLET, COATED ORAL
Qty: 90 TABLET | Refills: 1 | Status: SHIPPED | OUTPATIENT
Start: 2022-12-15

## 2022-12-16 ENCOUNTER — APPOINTMENT (OUTPATIENT)
Dept: FAMILY MEDICINE | Facility: CLINIC | Age: 47
End: 2022-12-16

## 2022-12-16 VITALS
TEMPERATURE: 98.2 F | DIASTOLIC BLOOD PRESSURE: 80 MMHG | HEIGHT: 75 IN | BODY MASS INDEX: 26.24 KG/M2 | WEIGHT: 211 LBS | SYSTOLIC BLOOD PRESSURE: 120 MMHG | HEART RATE: 72 BPM | OXYGEN SATURATION: 98 %

## 2022-12-16 PROCEDURE — 99214 OFFICE O/P EST MOD 30 MIN: CPT

## 2022-12-16 RX ORDER — LISINOPRIL 40 MG/1
TABLET ORAL
Qty: 90 TABLET | Refills: 1 | Status: SHIPPED | OUTPATIENT
Start: 2022-12-16

## 2022-12-16 NOTE — HISTORY OF PRESENT ILLNESS
[Hyperlipidemia] : Hyperlipidemia [FreeTextEntry6] : Pt is here for BP check. [Managed with medications] : managed with  medication [Moderate Intensity Therapy] : Patient is currently on moderate intensity statin  therapy

## 2022-12-18 LAB
ALBUMIN SERPL ELPH-MCNC: 4.8 G/DL
ALP BLD-CCNC: 62 U/L
ALT SERPL-CCNC: 16 U/L
ANION GAP SERPL CALC-SCNC: 13 MMOL/L
AST SERPL-CCNC: 14 U/L
BILIRUB SERPL-MCNC: 1 MG/DL
BUN SERPL-MCNC: 22 MG/DL
CALCIUM SERPL-MCNC: 9.5 MG/DL
CHLORIDE SERPL-SCNC: 102 MMOL/L
CHOLEST SERPL-MCNC: 194 MG/DL
CO2 SERPL-SCNC: 22 MMOL/L
CREAT SERPL-MCNC: 1.05 MG/DL
EGFR: 89 ML/MIN/1.73M2
GLUCOSE SERPL-MCNC: 84 MG/DL
HDLC SERPL-MCNC: 47 MG/DL
LDLC SERPL CALC-MCNC: 115 MG/DL
NONHDLC SERPL-MCNC: 147 MG/DL
POTASSIUM SERPL-SCNC: 4.2 MMOL/L
PROT SERPL-MCNC: 7 G/DL
SODIUM SERPL-SCNC: 137 MMOL/L
TRIGL SERPL-MCNC: 159 MG/DL

## 2023-01-17 ENCOUNTER — OFFICE VISIT (OUTPATIENT)
Dept: INTERNAL MEDICINE CLINIC | Age: 48
End: 2023-01-17
Payer: COMMERCIAL

## 2023-01-17 VITALS
SYSTOLIC BLOOD PRESSURE: 124 MMHG | WEIGHT: 149 LBS | BODY MASS INDEX: 20.18 KG/M2 | DIASTOLIC BLOOD PRESSURE: 70 MMHG | TEMPERATURE: 99 F | HEIGHT: 72 IN

## 2023-01-17 DIAGNOSIS — F19.10 SUBSTANCE ABUSE (HCC): ICD-10-CM

## 2023-01-17 DIAGNOSIS — Z76.89 ENCOUNTER TO ESTABLISH CARE: Primary | ICD-10-CM

## 2023-01-17 PROCEDURE — 99204 OFFICE O/P NEW MOD 45 MIN: CPT | Performed by: NURSE PRACTITIONER

## 2023-01-17 RX ORDER — LEVOTHYROXINE SODIUM 0.05 MG/1
50 TABLET ORAL DAILY
Qty: 90 TABLET | Refills: 0 | Status: SHIPPED | OUTPATIENT
Start: 2023-01-17

## 2023-01-17 SDOH — ECONOMIC STABILITY: FOOD INSECURITY: WITHIN THE PAST 12 MONTHS, THE FOOD YOU BOUGHT JUST DIDN'T LAST AND YOU DIDN'T HAVE MONEY TO GET MORE.: NEVER TRUE

## 2023-01-17 SDOH — ECONOMIC STABILITY: FOOD INSECURITY: WITHIN THE PAST 12 MONTHS, YOU WORRIED THAT YOUR FOOD WOULD RUN OUT BEFORE YOU GOT MONEY TO BUY MORE.: NEVER TRUE

## 2023-01-17 ASSESSMENT — PATIENT HEALTH QUESTIONNAIRE - PHQ9
SUM OF ALL RESPONSES TO PHQ9 QUESTIONS 1 & 2: 0
1. LITTLE INTEREST OR PLEASURE IN DOING THINGS: 0
SUM OF ALL RESPONSES TO PHQ QUESTIONS 1-9: 0
2. FEELING DOWN, DEPRESSED OR HOPELESS: 0

## 2023-01-17 ASSESSMENT — ENCOUNTER SYMPTOMS
ABDOMINAL PAIN: 0
COUGH: 0
SHORTNESS OF BREATH: 0
NAUSEA: 0
CONSTIPATION: 0
VOMITING: 0
EYE DISCHARGE: 0
WHEEZING: 0
BLOOD IN STOOL: 0
DIARRHEA: 0

## 2023-01-17 ASSESSMENT — SOCIAL DETERMINANTS OF HEALTH (SDOH): HOW HARD IS IT FOR YOU TO PAY FOR THE VERY BASICS LIKE FOOD, HOUSING, MEDICAL CARE, AND HEATING?: NOT HARD AT ALL

## 2023-01-17 NOTE — PROGRESS NOTES
01/17/23    Baltazar Haile  56 y.o.  male      Chief Complaint   Patient presents with    Establish Care         HPI:   Pt scheduled by [facility]) as a new patient to [facility] care. He states he was led to believe we were a clinic and he could come here for rehab. Per patient he called to get in with a rehab program due to multiple drug addictions. He was being treated in an online program and desired to have an in person treatment. He admits to many outpatient rehab programs but has never been treated as in patient. He was also being treated by psychiatry as he is bipolar and provider is no longer available. He has about a 30 year hx of drug abuse which has included oxy, pain paills, marijuana and heroin. He has been treated with suboxone since 2015. The only other drug he is using now is marijuana. 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 Encounter to establish care  I have reviewed the patient's medical history in detail and updated the computerized patient record. Explained that we are not a rehab clinic but an internal medicine office  Encouraged him to contact [facility] and continue his pursuit of treatment  I apologized for the inaccuracy in the information he was provided    2. Substance abuse ([facility] [address]  Provided him with a hand out from [employer] for Chemical Addictions Treatment  Pointed out phone number and web address  Discussed his need to establish with new psychiatrist  He will contact [facility] and go from there    /70   Temp 99 °F (37.2 °C)   Ht 6' (1.829 m)   Wt 149 lb (67.6 kg)   BMI 20.21 kg/m²     Current Outpatient Medications   Medication Sig Dispense Refill    Buprenorphine HCl-Naloxone HCl (SUBOXONE SL) Place under the tongue       No current facility-administered medications for this visit.        Social History     Socioeconomic History    Marital status: Single     Spouse name: Not on file    Number of children: Not on file    Years of education: Not on file    Highest education level: Not on file   Occupational History    Not on file   Tobacco Use    Smoking status: Former     Years: 1.00     Types: Cigarettes     Start date: 13     Quit date: 2016     Years since quittin.0    Smokeless tobacco: Never   Vaping Use    Vaping Use: Former   Substance and Sexual Activity    Alcohol use: Not Currently    Drug use: Yes     Types: Marijuana Jerelymariama November)    Sexual activity: Not on file   Other Topics Concern    Not on file   Social History Narrative    Not on file     Social Determinants of Health     Financial Resource Strain: Low Risk     Difficulty of Paying Living Expenses: Not hard at all   Food Insecurity: No Food Insecurity    Worried About Running Out of Food in the Last Year: Never true    Ran Out of Food in the Last Year: Never true   Transportation Needs: Not on file   Physical Activity: Not on file   Stress: Not on file   Social Connections: Not on file   Intimate Partner Violence: Not on file   Housing Stability: Not on file       Family History   Problem Relation Age of Onset    Heart Disease Mother        Past Medical History:   Diagnosis Date    Bipolar 1 disorder, depressed ([facility] [address]     Substance abuse ([address]        Review of Systems   Constitutional:  Negative for fever. HENT:  Negative for congestion. Eyes:  Negative for discharge. Respiratory:  Negative for cough, shortness of breath and wheezing. Cardiovascular:  Negative for chest pain, palpitations and leg swelling. Gastrointestinal:  Negative for abdominal pain, blood in stool, constipation, diarrhea, nausea and vomiting. Genitourinary:  Negative for dysuria and hematuria. Musculoskeletal:  Negative for myalgias. Skin:  Negative for rash. Neurological:  Negative for dizziness. Psychiatric/Behavioral:  The patient is not nervous/anxious. Physical Exam  Constitutional:       General: He is not in acute distress. Appearance: He is not diaphoretic.    HENT: Right Ear: External ear normal.      Left Ear: External ear normal.      Mouth/Throat:      Pharynx: No oropharyngeal exudate. Eyes:      General: No scleral icterus. Right eye: No discharge. Left eye: No discharge. Neck:      Thyroid: No thyromegaly. Cardiovascular:      Rate and Rhythm: Normal rate and regular rhythm. Heart sounds: Normal heart sounds. No murmur heard. No friction rub. No gallop. Pulmonary:      Effort: Pulmonary effort is normal.      Breath sounds: Normal breath sounds. No wheezing. Abdominal:      General: Bowel sounds are normal. There is no distension. Palpations: Abdomen is soft. Tenderness: There is no abdominal tenderness. Musculoskeletal:         General: No tenderness. Normal range of motion. Cervical back: Normal range of motion. Lymphadenopathy:      Cervical: No cervical adenopathy. Skin:     General: Skin is warm and dry. Findings: No erythema or rash. Neurological:      Mental Status: He is alert and oriented to person, place, and time.    Psychiatric:         Judgment: Judgment normal.              Sourav David, APRN - CNP

## 2023-02-06 RX ORDER — ROSUVASTATIN CALCIUM 10 MG/1
TABLET, COATED ORAL
Qty: 90 TABLET | Refills: 0 | Status: SHIPPED | OUTPATIENT
Start: 2023-02-06

## 2023-02-26 ENCOUNTER — RX RENEWAL (OUTPATIENT)
Age: 48
End: 2023-02-26

## 2023-03-31 ENCOUNTER — APPOINTMENT (OUTPATIENT)
Dept: FAMILY MEDICINE | Facility: CLINIC | Age: 48
End: 2023-03-31
Payer: COMMERCIAL

## 2023-03-31 ENCOUNTER — NON-APPOINTMENT (OUTPATIENT)
Age: 48
End: 2023-03-31

## 2023-03-31 VITALS
SYSTOLIC BLOOD PRESSURE: 120 MMHG | OXYGEN SATURATION: 98 % | BODY MASS INDEX: 26.61 KG/M2 | WEIGHT: 214 LBS | TEMPERATURE: 98.2 F | HEART RATE: 66 BPM | HEIGHT: 75 IN | DIASTOLIC BLOOD PRESSURE: 80 MMHG

## 2023-03-31 LAB
BILIRUB UR QL STRIP: NORMAL
CLARITY UR: CLEAR
COLLECTION METHOD: NORMAL
GLUCOSE UR-MCNC: NORMAL
HCG UR QL: 1 EU/DL
HGB UR QL STRIP.AUTO: NORMAL
KETONES UR-MCNC: NORMAL
LEUKOCYTE ESTERASE UR QL STRIP: NORMAL
NITRITE UR QL STRIP: NORMAL
PH UR STRIP: 7
PROT UR STRIP-MCNC: NORMAL
SP GR UR STRIP: 1.02

## 2023-03-31 PROCEDURE — 93000 ELECTROCARDIOGRAM COMPLETE: CPT

## 2023-03-31 PROCEDURE — 99396 PREV VISIT EST AGE 40-64: CPT | Mod: 25

## 2023-03-31 PROCEDURE — 36415 COLL VENOUS BLD VENIPUNCTURE: CPT

## 2023-03-31 PROCEDURE — 92551 PURE TONE HEARING TEST AIR: CPT

## 2023-03-31 PROCEDURE — 81003 URINALYSIS AUTO W/O SCOPE: CPT | Mod: QW

## 2023-03-31 PROCEDURE — 99173 VISUAL ACUITY SCREEN: CPT

## 2023-03-31 PROCEDURE — 85610 PROTHROMBIN TIME: CPT | Mod: QW

## 2023-03-31 NOTE — HEALTH RISK ASSESSMENT
[Yes] : Yes [2 - 3 times a week (3 pts)] : 2 - 3  times a week (3 points) [1 or 2 (0 pts)] : 1 or 2 (0 points) [Never (0 pts)] : Never (0 points) [No] : In the past 12 months have you used drugs other than those required for medical reasons? No [No falls in past year] : Patient reported no falls in the past year [0] : 2) Feeling down, depressed, or hopeless: Not at all (0) [PHQ-2 Negative - No further assessment needed] : PHQ-2 Negative - No further assessment needed [HIV test declined] : HIV test declined [Hepatitis C test declined] : Hepatitis C test declined [With Family] : lives with family [# of Members in Household ___] :  household currently consist of [unfilled] member(s) [Employed] : employed [College] : College [] :  [# Of Children ___] : has [unfilled] children [Sexually Active] : sexually active [Feels Safe at Home] : Feels safe at home [Fully functional (bathing, dressing, toileting, transferring, walking, feeding)] : Fully functional (bathing, dressing, toileting, transferring, walking, feeding) [Fully functional (using the telephone, shopping, preparing meals, housekeeping, doing laundry, using] : Fully functional and needs no help or supervision to perform IADLs (using the telephone, shopping, preparing meals, housekeeping, doing laundry, using transportation, managing medications and managing finances) [Reports normal functional visual acuity (ie: able to read med bottle)] : Reports normal functional visual acuity [Smoke Detector] : smoke detector [Carbon Monoxide Detector] : carbon monoxide detector [Safety elements used in home] : safety elements used in home [Seat Belt] :  uses seat belt [Sunscreen] : uses sunscreen [Travel to Developing Areas] : travel to developing areas [Very Good] : ~his/her~  mood as very good [Patient reported colonoscopy was normal] : Patient reported colonoscopy was normal [Reviewed no changes] : Reviewed, no changes [Never] : Never [Audit-CScore] : 3 [[id number]] : 0 [Change in mental status noted] : No change in mental status noted [Language] : denies difficulty with language [Behavior] : denies difficulty with behavior [Learning/Retaining New Information] : denies difficulty learning/retaining new information [Handling Complex Tasks] : denies difficulty handling complex tasks [Reasoning] : denies difficulty with reasoning [Spatial Ability and Orientation] : denies difficulty with spatial ability and orientation [Reports changes in hearing] : Reports no changes in hearing [Reports changes in vision] : Reports no changes in vision [Reports changes in dental health] : Reports no changes in dental health [Guns at Home] : no guns at home [TB Exposure] : is not being exposed to tuberculosis [Caregiver Concerns] : does not have caregiver concerns [ColonoscopyDate] : 01/23 [ColonoscopyComments] : Family hx of colon cancer, pt has had 6 prior colonoscopy  [FreeTextEntry2] : sales [AdvancecareDate] : 03/31/23

## 2023-03-31 NOTE — HISTORY OF PRESENT ILLNESS
[FreeTextEntry1] : Pt is here for his CPE. [de-identified] : Mr. Lorenzana is a 48 yo M with PMH significant for HTN, HLD and family history of colon cancer, his last colonoscopy was 3-months ago with no concerning findings. He reports being in good Physcial health over the last year. He denies any medication side effects.

## 2023-03-31 NOTE — PHYSICAL EXAM
[20/___] : left eye 20/[unfilled] [PERRL] : pupils equal round and reactive to light [EOMI] : extraocular movements intact [Normal Outer Ear/Nose] : the outer ears and nose were normal in appearance [Normal Oropharynx] : the oropharynx was normal [No JVD] : no jugular venous distention [Supple] : supple [Thyroid Normal, No Nodules] : the thyroid was normal and there were no nodules present [Clear to Auscultation] : lungs were clear to auscultation bilaterally [Normal Rate] : normal rate  [Normal S1, S2] : normal S1 and S2 [No Carotid Bruits] : no carotid bruits [No Edema] : there was no peripheral edema [Soft] : abdomen soft [Non Tender] : non-tender [Normal Posterior Cervical Nodes] : no posterior cervical lymphadenopathy [Normal Anterior Cervical Nodes] : no anterior cervical lymphadenopathy [Normal] : affect was normal and insight and judgment were intact [FreeTextEntry1] : .\par Right Ear\par \par 0.5-35\par 1-20\par 2-15\par 4-35\par \par Left Ear\par \par 0.5-25\par 1-20\par 2-20\par 4-20\par

## 2023-03-31 NOTE — COUNSELING
[Sleep ___ hours/day] : Sleep [unfilled] hours/day [AUDIT-C Screening administered and reviewed] : AUDIT-C Screening administered and reviewed [None] : None [de-identified] : Diet, exercise and sleep discussed

## 2023-04-02 LAB
BASOPHILS # BLD AUTO: 0.06 K/UL
BASOPHILS NFR BLD AUTO: 1.3 %
CHOLEST SERPL-MCNC: 171 MG/DL
EOSINOPHIL # BLD AUTO: 0.09 K/UL
EOSINOPHIL NFR BLD AUTO: 1.9 %
HCT VFR BLD CALC: 49 %
HDLC SERPL-MCNC: 45 MG/DL
HGB BLD-MCNC: 15.7 G/DL
IMM GRANULOCYTES NFR BLD AUTO: 0.2 %
LDLC SERPL CALC-MCNC: 103 MG/DL
LYMPHOCYTES # BLD AUTO: 1.51 K/UL
LYMPHOCYTES NFR BLD AUTO: 31.7 %
MAN DIFF?: NORMAL
MCHC RBC-ENTMCNC: 30.6 PG
MCHC RBC-ENTMCNC: 32 GM/DL
MCV RBC AUTO: 95.5 FL
MONOCYTES # BLD AUTO: 0.51 K/UL
MONOCYTES NFR BLD AUTO: 10.7 %
NEUTROPHILS # BLD AUTO: 2.59 K/UL
NEUTROPHILS NFR BLD AUTO: 54.2 %
NONHDLC SERPL-MCNC: 125 MG/DL
PLATELET # BLD AUTO: 248 K/UL
RBC # BLD: 5.13 M/UL
RBC # FLD: 12.9 %
T3FREE SERPL-MCNC: 2.99 PG/ML
T4 FREE SERPL-MCNC: 1.4 NG/DL
TRIGL SERPL-MCNC: 112 MG/DL
TSH SERPL-ACNC: 1.45 UIU/ML
WBC # FLD AUTO: 4.77 K/UL

## 2023-04-16 RX ORDER — LEVOTHYROXINE SODIUM 0.05 MG/1
TABLET ORAL
Qty: 90 TABLET | Refills: 0 | Status: SHIPPED | OUTPATIENT
Start: 2023-04-16

## 2023-04-18 ENCOUNTER — TRANSCRIPTION ENCOUNTER (OUTPATIENT)
Age: 48
End: 2023-04-18

## 2023-04-18 LAB
ALBUMIN SERPL ELPH-MCNC: 4.8 G/DL
ALP BLD-CCNC: 62 U/L
ALT SERPL-CCNC: 22 U/L
ANION GAP SERPL CALC-SCNC: 11 MMOL/L
AST SERPL-CCNC: 19 U/L
BILIRUB SERPL-MCNC: 1 MG/DL
BUN SERPL-MCNC: 15 MG/DL
CALCIUM SERPL-MCNC: 9.1 MG/DL
CHLORIDE SERPL-SCNC: 102 MMOL/L
CO2 SERPL-SCNC: 26 MMOL/L
CREAT SERPL-MCNC: 1.44 MG/DL
EGFR: 61 ML/MIN/1.73M2
GLUCOSE SERPL-MCNC: 97 MG/DL
POTASSIUM SERPL-SCNC: 4.5 MMOL/L
PROT SERPL-MCNC: 6.9 G/DL
SODIUM SERPL-SCNC: 140 MMOL/L

## 2023-05-06 RX ORDER — ROSUVASTATIN CALCIUM 10 MG/1
TABLET, COATED ORAL
Qty: 90 TABLET | Refills: 0 | Status: SHIPPED | OUTPATIENT
Start: 2023-05-06

## 2023-05-18 ENCOUNTER — NON-APPOINTMENT (OUTPATIENT)
Age: 48
End: 2023-05-18

## 2023-05-22 ENCOUNTER — RX RENEWAL (OUTPATIENT)
Age: 48
End: 2023-05-22

## 2023-06-02 RX ORDER — LISINOPRIL 40 MG/1
TABLET ORAL
Qty: 30 TABLET | Refills: 0 | Status: SHIPPED | OUTPATIENT
Start: 2023-06-02

## 2023-06-17 RX ORDER — FENOFIBRATE 145 MG/1
TABLET, COATED ORAL
Qty: 90 TABLET | Refills: 1 | Status: SHIPPED | OUTPATIENT
Start: 2023-06-17

## 2023-06-17 NOTE — TELEPHONE ENCOUNTER
Name from pharmacy: FENOFIBRATE 145 MG TABLET          Will file in chart as: FENOFIBRATE 145 MG Oral Tab    Sig: TAKE 1 TABLET BY MOUTH EVERY DAY    Disp: 90 tablet    Refills: 1    Start: 6/16/2023    Class: Normal    Non-formulary    Last ordered: 6 months ago by Domitila Olson MD Last refill: 3/10/2023    Rx #: [id number]    Cholesterol Medication Protocol [id number]/16/2023 12:28 AM   Protocol Details ALT < 80    ALT resulted within past year    Lipid panel within past 12 months    Appointment within past 12 or next 3 months      To be filled at: [address], [address] [place] [address] [phone], [phone]
36

## 2023-07-05 RX ORDER — LISINOPRIL 40 MG/1
40 TABLET ORAL DAILY
Qty: 30 TABLET | Refills: 0 | Status: SHIPPED | OUTPATIENT
Start: 2023-07-05

## 2023-07-12 ENCOUNTER — OFFICE VISIT (OUTPATIENT)
Dept: FAMILY MEDICINE CLINIC | Facility: CLINIC | Age: 48
End: 2023-07-12
Payer: COMMERCIAL

## 2023-07-12 VITALS
DIASTOLIC BLOOD PRESSURE: 82 MMHG | RESPIRATION RATE: 16 BRPM | SYSTOLIC BLOOD PRESSURE: 126 MMHG | TEMPERATURE: 98 F | BODY MASS INDEX: 30.66 KG/M2 | OXYGEN SATURATION: 98 % | HEIGHT: 69 IN | HEART RATE: 68 BPM | WEIGHT: 207 LBS

## 2023-07-12 DIAGNOSIS — Z12.5 SCREENING FOR MALIGNANT NEOPLASM OF PROSTATE: ICD-10-CM

## 2023-07-12 DIAGNOSIS — R06.81 APNEA: ICD-10-CM

## 2023-07-12 DIAGNOSIS — Z00.00 WELLNESS EXAMINATION: Primary | ICD-10-CM

## 2023-07-12 DIAGNOSIS — E78.2 MIXED HYPERLIPIDEMIA: ICD-10-CM

## 2023-07-12 PROCEDURE — 3074F SYST BP LT 130 MM HG: CPT | Performed by: FAMILY MEDICINE

## 2023-07-12 PROCEDURE — 99396 PREV VISIT EST AGE 40-64: CPT | Performed by: FAMILY MEDICINE

## 2023-07-12 PROCEDURE — 3079F DIAST BP 80-89 MM HG: CPT | Performed by: FAMILY MEDICINE

## 2023-07-12 PROCEDURE — 3008F BODY MASS INDEX DOCD: CPT | Performed by: FAMILY MEDICINE

## 2023-07-12 RX ORDER — ROSUVASTATIN CALCIUM 10 MG/1
10 TABLET, COATED ORAL NIGHTLY
Qty: 90 TABLET | Refills: 1 | Status: SHIPPED | OUTPATIENT
Start: 2023-07-12

## 2023-07-12 RX ORDER — LEVOTHYROXINE SODIUM 0.05 MG/1
50 TABLET ORAL DAILY
Qty: 90 TABLET | Refills: 1 | Status: SHIPPED | OUTPATIENT
Start: 2023-07-12

## 2023-07-12 RX ORDER — LISINOPRIL 40 MG/1
40 TABLET ORAL DAILY
Qty: 90 TABLET | Refills: 1 | Status: SHIPPED | OUTPATIENT
Start: 2023-07-12

## 2023-07-12 RX ORDER — PAROXETINE HYDROCHLORIDE 20 MG/1
20 TABLET, FILM COATED ORAL DAILY
Qty: 90 TABLET | Refills: 3 | Status: SHIPPED | OUTPATIENT
Start: 2023-07-12

## 2023-07-12 NOTE — PATIENT INSTRUCTIONS
Thank you for choosing Katty Ham MD at [place] [address]  To Do: Dayanara Ivy  1. Please see age appropriate health prevention below    [facility] is located in [address]. Monday, Tuesday & Friday - 8 a.m. to 4 p.m. Wednesday, Thursday - 7 a.m. to 3 p.m. The lab is closed daily from 12 p.m.-12:30 p.m. Saturday lab hours by appointment. Call [phone] to schedule the appointment. Please signup for Hostway, which is electronic access to your record if you have not done so. All your results will post on there. [email or web address]   You can NOW use Hostway to book your appointments with us, or consider using open access scheduling which is through the edward website [email or web address] and type in Katty Ham MD and follow the links for \"Schedule Online Now\"    To schedule Imaging or tests at [facility] Scheduling [phone], Go to [facility] A ER Building (For example: CT scans, X rays, Ultrasound, MRI)  Cardiac Testing in ER building Building A second floor Cardiac Testing [phone] (For example: Holter Monitor, Cardiac Stress tests,Event Monitor, or 2D Echocardiograms)  [facility] call [phone] usually in [facility] A  Walk in Clinic in [place] at [facility]. [address] Mon-Fri at 8am-7:30 p.m., and Sat/Sun 9:00a. m.-4:30 p.m. Also at [address] [employer]  Call [phone] for info     Please call our office about any questions regarding your treatment/medicines/tests as a result of today's visit. For your safety, read the entire package insert of all medicines prescribed to you and be aware of all of the risks of treatment even beyond those discussed today. All therapies have potential risk of harm or side effects or medication interactions.   It is your duty and for your safety to discuss with the pharmacist and our office with questions, and to notify us and stop treatment if problems arise, but know that our intention is that the benefits outweigh those potential risks and we strive to make you healthier and to improve your quality of life. Referrals, and Radiology Information:    If your insurance requires a referral to a specialist, please allow 5 business days to process your referral request.    If Shelley Lau MD orders a CT or MRI, it may take up to 10 business days to receive approval from your insurance company. Once our office has called informing you that the insurance company approved your testing, please call Central Scheduling at [phone]  Please allow our office 5 business days to contact you regarding any testing results. Refill policies:   Allow 3 business days for refills; controlled substances may take longer and must be picked up from the office in person. Narcotic medications can only be filled in 30 day increments and must be refilled at an office visit only. If your prescription is due for a refill, you may be due for a follow-up appointment. We cannot refill your maintenance medications at a preventative wellness visit. To best provide you care, patients receiving maintenance medications need to be seen at least twice a year.

## 2023-09-03 ENCOUNTER — RX RENEWAL (OUTPATIENT)
Age: 48
End: 2023-09-03

## 2023-09-05 ENCOUNTER — RX RENEWAL (OUTPATIENT)
Age: 48
End: 2023-09-05

## 2023-09-05 RX ORDER — TADALAFIL 10 MG/1
10 TABLET, FILM COATED ORAL
Qty: 6 | Refills: 0 | Status: ACTIVE | COMMUNITY
Start: 2020-12-22 | End: 1900-01-01

## 2023-09-30 ENCOUNTER — APPOINTMENT (OUTPATIENT)
Dept: FAMILY MEDICINE | Facility: CLINIC | Age: 48
End: 2023-09-30
Payer: COMMERCIAL

## 2023-09-30 VITALS
HEIGHT: 75 IN | DIASTOLIC BLOOD PRESSURE: 86 MMHG | WEIGHT: 214 LBS | SYSTOLIC BLOOD PRESSURE: 116 MMHG | HEART RATE: 62 BPM | BODY MASS INDEX: 26.61 KG/M2 | OXYGEN SATURATION: 97 % | TEMPERATURE: 98 F

## 2023-09-30 DIAGNOSIS — Z23 ENCOUNTER FOR IMMUNIZATION: ICD-10-CM

## 2023-09-30 PROCEDURE — 99214 OFFICE O/P EST MOD 30 MIN: CPT | Mod: 25

## 2023-09-30 PROCEDURE — G0008: CPT

## 2023-09-30 PROCEDURE — 90686 IIV4 VACC NO PRSV 0.5 ML IM: CPT

## 2023-11-04 ENCOUNTER — HOSPITAL ENCOUNTER (OUTPATIENT)
Dept: CT IMAGING | Age: 48
Discharge: HOME | End: 2023-11-04
Attending: FAMILY MEDICINE

## 2023-11-04 DIAGNOSIS — E78.2 MIXED HYPERLIPIDEMIA: ICD-10-CM

## 2023-11-15 ENCOUNTER — OFFICE VISIT (OUTPATIENT)
Dept: SLEEP CENTER | Age: 48
End: 2023-11-15
Attending: Other
Payer: COMMERCIAL

## 2023-11-15 DIAGNOSIS — R06.81 APNEA: ICD-10-CM

## 2023-11-15 PROCEDURE — 95811 POLYSOM 6/>YRS CPAP 4/> PARM: CPT

## 2023-11-17 ENCOUNTER — RX RENEWAL (OUTPATIENT)
Age: 48
End: 2023-11-17

## 2023-11-17 PROBLEM — G47.33 OSA ON CPAP: Status: ACTIVE | Noted: 2023-11-17

## 2023-11-18 ENCOUNTER — TELEPHONE (OUTPATIENT)
Dept: INTERNAL MEDICINE CLINIC | Facility: HOSPITAL | Age: 48
End: 2023-11-18

## 2023-11-18 NOTE — TELEPHONE ENCOUNTER
Outside Covid Testing done    Results and RTW guidelines:    COVID RESULT reported:      Test type:    [] Rapid outside         [] PCR outside       [x] Home Test    Date of test: 11/17/2023     Test location: Home         [] Result viewed in Epic with verbal consent received from employee     [x] Results per Employee Covid Dashboard    [] Employee instructed to email copy of result to [email or web address]       [x] Discussed with employee     [] Unable to reach by phone. Sent via [employer]      [x] Positive    - Employee should quarantine at home for at least 5 days (day 1 is day after sx onset) , follow the CDC guidelines for cleaning and                              quarantining; see CDC.gov   -This employee may RTW on day 6 if asymptomatic or mildly symptomatic (with improving symptoms). Call Employee Health on day 5 if unable to return on                      day 6 after symptom onset.    -This employee needs to call Employee Health on day 5 after symptom onset. The employee needs to be cleared by Employee Health. - If Employee is still experiencing severe symptoms must make a RTW appt with Employee Health, Employee will not be cleared if:    1. Has consistent cough, shortness of breath or fatigue that restricts your physical activities    2. Is still feeling \"unwell\"    3. Within 15 days of hospitalization for COVID    4. Within 20 days of intubation for COVID    5. Still has a fever, vomiting or diarrhea   - Keep communication open with management about RTW and if symptoms worsen    - Monitor sx and temperature    - Employee should quarantine at home for at least 5 days from sx onset, follow the CDC guidelines for cleaning and quarantining; see CDC. gov                  - Notify PCP of result                 - Seek emergent care with worsening symptoms        Notes:     RTW PLAN:    [x]  If COVID positive results, off work minimum of 5 days from positive test or onset of symptoms (day 0) On day 5, if asymptomatic or mildly symptomatic (with improving symptoms) may return to work day 6          On day 5, if symptomatic, call Employee Health for RTW screening        []  COVID positive result - call Employee Health on day 5 after symptom onset. The employee needs to be cleared by Employee Health to RTW. [] RTW immediately, continue to monitor for sx  [] RTW when sx improve; must be fever free for 24 hours w/o medications, Diarrhea/Vomiting free for 24 hours w/o medications  [] Alinity ordered; continue to monitor sx and call for new/worsening sx. Discuss RTW guidelines with manager                                                                                                                                                                        [] [] Rapid ordered to confirm home negative. [] May continue to work  [x] Follow up with PCP  [] Home until further instruction from hotline with Alinity results  INSTRUCTIONS PROVIDED:  [x]  Plan as noted above  []  Length of time to obtain results  []  May return to work if views negative in My Chart and  remains fever, vomiting, and diarrhea free  []  May continue to work if remains asymptomatic   [x]  Quarantine instructions  [x]  Masking protocol  []  S/S of worsening infection/condition and importance of prompt medical re-evaluation including when to seek emergency care  [] If symptoms develop, stay home and call hotline for rapid test order    Estimated RTW date:  11/22/2023  [x] Manager notified        [] [facility]  [x]LEELA   [] [facility]  Manager : Seymour Waggoner 34 COVID-19 Vaccine?  Yes [x]    No []          If yes, date(s) received:    12/20/2020; 01/10/2021; 12/02/2021        Which vaccine:  Pfizer [x]     Meg Abraham []    J&J []      SYMPTOMS (reported via dashboard):  [] asymptomatic  [x] symptomatic  [] GI symptoms only    Symptom onset date: 11/16/2023    Fever   > 100F             Yes [x]      Cough                          Yes [x] Shortness of breath  Yes []      Congestion                 Yes [x]      Runny nose                Yes [x]        Loss of Smell              Yes []        Loss of Taste             Yes []       Sore throat                 Yes [x]       Fatigue                        Yes [x]       Body Aches                Yes [x]        Chills                           Yes [x]        Headache                   Yes []             GI symptoms             Yes []     No [x]                     Nausea   []          Vomiting            []                                    Diarrhea  []          Upset stomach []      Employee has positive COVID Exposure? Yes []     No [x]    Date of exposure:     []  Coworker                       [] patient                        [] Family/friend    Employee has a history of Covid?   Yes [x]     No []   If Yes, when: 05/2022    When was the last shift you worked?: 11/17/2023    Notes:

## 2023-11-21 ENCOUNTER — SLEEP STUDY (OUTPATIENT)
Facility: CLINIC | Age: 48
End: 2023-11-21
Payer: COMMERCIAL

## 2023-11-21 DIAGNOSIS — G47.33 OBSTRUCTIVE SLEEP APNEA SYNDROME: Primary | ICD-10-CM

## 2023-11-21 PROCEDURE — 95811 POLYSOM 6/>YRS CPAP 4/> PARM: CPT | Performed by: OTHER

## 2023-12-12 RX ORDER — FENOFIBRATE 145 MG/1
145 TABLET, COATED ORAL DAILY
Qty: 90 TABLET | Refills: 1 | Status: SHIPPED | OUTPATIENT
Start: 2023-12-12

## 2023-12-14 RX ORDER — LEVOTHYROXINE SODIUM 0.05 MG/1
50 TABLET ORAL DAILY
Qty: 90 TABLET | Refills: 1 | Status: SHIPPED | OUTPATIENT
Start: 2023-12-14

## 2024-01-15 RX ORDER — ROSUVASTATIN CALCIUM 10 MG/1
10 TABLET, COATED ORAL NIGHTLY
Qty: 90 TABLET | Refills: 1 | Status: SHIPPED | OUTPATIENT
Start: 2024-01-15

## 2024-01-23 ENCOUNTER — OFFICE VISIT (OUTPATIENT)
Facility: CLINIC | Age: 49
End: 2024-01-23
Payer: COMMERCIAL

## 2024-01-23 VITALS
HEART RATE: 66 BPM | DIASTOLIC BLOOD PRESSURE: 70 MMHG | SYSTOLIC BLOOD PRESSURE: 134 MMHG | WEIGHT: 205.5 LBS | OXYGEN SATURATION: 97 % | RESPIRATION RATE: 18 BRPM | HEIGHT: 69 IN | BODY MASS INDEX: 30.44 KG/M2

## 2024-01-23 DIAGNOSIS — G47.10 HYPERSOMNIA: Primary | ICD-10-CM

## 2024-01-23 DIAGNOSIS — G47.33 OBSTRUCTIVE SLEEP APNEA: ICD-10-CM

## 2024-01-23 DIAGNOSIS — E66.09 CLASS 1 OBESITY DUE TO EXCESS CALORIES WITHOUT SERIOUS COMORBIDITY WITH BODY MASS INDEX (BMI) OF 30.0 TO 30.9 IN ADULT: ICD-10-CM

## 2024-01-23 PROCEDURE — 3008F BODY MASS INDEX DOCD: CPT | Performed by: OTHER

## 2024-01-23 PROCEDURE — 3075F SYST BP GE 130 - 139MM HG: CPT | Performed by: OTHER

## 2024-01-23 PROCEDURE — 99204 OFFICE O/P NEW MOD 45 MIN: CPT | Performed by: OTHER

## 2024-01-23 PROCEDURE — 3078F DIAST BP <80 MM HG: CPT | Performed by: OTHER

## 2024-01-23 NOTE — PROGRESS NOTES
[facility] PULMONARY  SLEEP PROGRESS NOTE        HPI:   This is a 48 year old male coming in for   Chief Complaint   Patient presents with    Consult     Sleep Consult - sleep study - 11/17/23       HPI:   This is a 48 year old male who presents with the following symptoms, risk factors, behaviors or other items associated with sleep problems.    Sleep Apnea:   snoring; gasping/choking; stops breathing; mouth breathing; nasal congestion; non-refreshing sleep; overweight; high blood pressure; family member with sleep apnea  Insomnia:  non-refreshing sleep  Restless Leg:  no symptoms or restless legs  Parasomnias:   sleep talking; acting out dreams  Daytime Problems:  sleepiness; fatigue; dozing off at work    The patient's [place] Sleepiness score is 9/24.    Sleep Study- 11/17/23    Had  sleep apnea for a long time  Snoring gasping   Sleep time 930-530,   May nap on weekends  Works every other weekends  Works clinic on Saturdays  Long days    He is an internal medicine now focusing on psychiatry at [employer]      Patient: Sleep review of systems today: see form.      Pt  PCP:  Piyush Galloway MD  No referring provider defined for this encounter.           No data to display                    Past Medical History:   Diagnosis Date    Allergic rhinitis 2007    Anxiety state, unspecified     Back pain     Back problem     Blood in the stool     Decorative tattoo     HIGH BLOOD PRESSURE     High cholesterol     Hyperlipidemia 1998    Irregular bowel habits     Lipid screening 03/16/2012    PEGGY on CPAP     Thyroid disease     Unspecified essential hypertension     Wears glasses      Past Surgical History:   Procedure Laterality Date    BACK SURGERY  11-7-13    L5-S1 ANNELIESE     INJECTION, ANESTHETIC/STEROID, TRANSFORAMINAL EPIDURAL; LUMBAR/SACRAL, SINGLE LEVEL  9/6/2013    Procedure: TRANSFORAMINAL EPIDURAL - LUMBAR;  Surgeon: Gregg Gibbons MD;  Location: [facility]    INJECTION, ANESTHETIC/STEROID,  TRANSFORAMINAL EPIDURAL; LUMBAR/SACRAL, SINGLE LEVEL  9/27/2013    Procedure: TRANSFORAMINAL EPIDURAL - LUMBAR;  Surgeon: Gregg Gibbons MD;  Location: [facility]     Social History:  Social History     Social History Narrative    Not on file     Social History     Socioeconomic History    Marital status:    Tobacco Use    Smoking status: Never    Smokeless tobacco: Never   Substance and Sexual Activity    Alcohol use: Yes     Alcohol/week: 2.0 standard drinks of alcohol     Types: 2 Cans of beer per week     Comment: 2-3 times a month    Drug use: No   Other Topics Concern    Caffeine Concern Yes     Comment: Daily; 3-4 cups     Exercise No     Family History:  Family History   Problem Relation Age of Onset    Hypertension Father         CVA 65    Hypertension Mother     Heart Disease Paternal Grandmother         CHF    Colon Cancer Paternal Aunt     Heart Disorder Maternal Grandfather         CAD 40s, CHF    Cancer Paternal Grandfather         CLL     Allergies:  Allergies   Allergen Reactions    Atorvastatin MYALGIA    Simvastatin MYALGIA     Current Meds:  Current Outpatient Medications   Medication Sig Dispense Refill    rosuvastatin 10 MG Oral Tab Take 1 tablet (10 mg total) by mouth nightly. 90 tablet 1    levothyroxine 50 MCG Oral Tab Take 1 tablet (50 mcg total) by mouth daily. 90 tablet 1    fenofibrate 145 MG Oral Tab Take 1 tablet (145 mg total) by mouth daily. 90 tablet 1    lisinopril 40 MG Oral Tab Take 1 tablet (40 mg total) by mouth daily. 90 tablet 1    PARoxetine 20 MG Oral Tab Take 1 tablet (20 mg total) by mouth daily. 90 tablet 3    Multiple Vitamins-Minerals (MULTIVITAMIN ADULTS OR) Take by mouth.      Omega-3 Fatty Acids (FISH OIL OR) Take by mouth.        Counseling given: Not Answered         Problem List:  Patient Active Problem List   Diagnosis    Mixed hyperlipidemia    Screening for lipoid disorders    Irritable bowel syndrome    Essential hypertension     Renal artery stenosis (HCC)    Herniated lumbar intervertebral disc    Lumbar radiculopathy    Foot drop, right    Calcified granuloma of lung (HCC)    Anxiety    Special screening for malignant neoplasm of colon    Obstructive sleep apnea    Class 1 obesity due to excess calories without serious comorbidity with body mass index (BMI) of 30.0 to 30.9 in adult    Hypersomnia       REVIEW OF SYSTEMS:   Review of Systems    EXAM:   /70 (BP Location: Right arm, Patient Position: Sitting, Cuff Size: adult)   Pulse 66   Resp 18   Ht 5' 9\" (1.753 m)   Wt 205 lb 8 oz (93.2 kg)   SpO2 97%   BMI 30.35 kg/m²  Estimated body mass index is 30.35 kg/m² as calculated from the following:    Height as of this encounter: 5' 9\" (1.753 m).    Weight as of this encounter: 205 lb 8 oz (93.2 kg).   Neck in inches:      Wt Readings from Last 6 Encounters:   01/23/24 205 lb 8 oz (93.2 kg)   07/12/23 207 lb (93.9 kg)   11/08/22 204 lb (92.5 kg)   06/30/22 204 lb 4 oz (92.6 kg)   06/02/21 198 lb (89.8 kg)   09/02/20 197 lb (89.4 kg)     BP Readings from Last 3 Encounters:   01/23/24 134/70   07/12/23 126/82   06/30/22 138/78     Pulse Readings from Last 3 Encounters:   01/23/24 66   07/12/23 68   06/30/22 60     SpO2 Readings from Last 3 Encounters:   01/23/24 97%   07/12/23 98%   06/30/22 96%      Ideal body weight: 70.7 kg (155 lb 13.8 oz)  Adjusted ideal body weight: 79.7 kg (175 lb 11.5 oz)    Vital signs reviewed.  Physical Exam    ASSESSMENT AND PLAN:   1. Hypersomnia    2. Obstructive sleep apnea  Previously evaluated for PEGGY but has not had cpap trial  Will plan on pap trial and followup in 31-90 day period    The pathophysiology and mechanisms of obstructive sleep apnea were explained.  Adverse health consequences seen in association with PEGGY include increased risk of cardiovascular events , cerebrovascular events, hypertension,  diabetes. Treatment options were discussed.  Positive airway pressure therapy is the gold  standard.  Other options include mandibular advancement devices, evaluation of the upper airway by ear nose throat specialist, inspire therapy, and weight loss to be used in conjunction.   3. Class 1 obesity due to excess calories without serious comorbidity with body mass index (BMI) of 30.0 to 30.9 in adult    There are no Patient Instructions on file for this visit.    Independent interpretation of Sleep Download as defined above.  Continue with Rx management of Sleep apnea with PAP therapy.    COMPLIANCE is required by insurance for 4 hours a night most nights of the week.    Advised if still with sleep apnea and not using CPAP has a 7 fold increase in risk of heart attack, stroke, abnormal heart rhythm  and death,  increased risk of driving accidents.     Advised to refrain from driving when sleepy.      Recommend weight loss, and maintain and optimal BMI with Exercise 30 minutes most days to target heart rate .     Advised patient to change filters,masks,hoses  and tubes and equiptment on a  regular schedule.    Filters and seals shall be changed every 1 month,  Hoses every 3 months,   CPAP mask and humidifier chamber changed every 6 month  with the durable medical equipment provider.         Meds & Refills for this Visit:  Requested Prescriptions      No prescriptions requested or ordered in this encounter       Outcome: Parent verbalizes understanding. Parent is notified to call with any questions, complications, allergies, or worsening or changing symptoms.  Parent is to call with any side effects or complications from the treatments as a result of today.     \" This note was created utilizing Dragon speech recognition software.  Please excuse any grammatical errors. Call my office if you have any questions regarding this note. \"     Ursula Alcocer,   1/23/2024  9:27 AM

## 2024-01-24 PROBLEM — G47.33 OBSTRUCTIVE SLEEP APNEA: Status: ACTIVE | Noted: 2023-11-17

## 2024-01-24 PROBLEM — G47.33 OSA ON CPAP: Status: RESOLVED | Noted: 2023-11-17 | Resolved: 2024-01-24

## 2024-01-24 PROBLEM — E66.811 CLASS 1 OBESITY DUE TO EXCESS CALORIES WITHOUT SERIOUS COMORBIDITY WITH BODY MASS INDEX (BMI) OF 30.0 TO 30.9 IN ADULT: Status: ACTIVE | Noted: 2024-01-24

## 2024-01-24 PROBLEM — E66.09 CLASS 1 OBESITY DUE TO EXCESS CALORIES WITHOUT SERIOUS COMORBIDITY WITH BODY MASS INDEX (BMI) OF 30.0 TO 30.9 IN ADULT: Status: ACTIVE | Noted: 2024-01-24

## 2024-01-24 PROBLEM — G47.10 HYPERSOMNIA: Status: ACTIVE | Noted: 2024-01-24

## 2024-01-28 RX ORDER — LISINOPRIL 40 MG/1
40 TABLET ORAL DAILY
Qty: 90 TABLET | Refills: 1 | Status: SHIPPED | OUTPATIENT
Start: 2024-01-28

## 2024-02-01 ENCOUNTER — TELEPHONE (OUTPATIENT)
Facility: CLINIC | Age: 49
End: 2024-02-01

## 2024-02-01 DIAGNOSIS — G47.33 OSA (OBSTRUCTIVE SLEEP APNEA): Primary | ICD-10-CM

## 2024-04-12 ENCOUNTER — NON-APPOINTMENT (OUTPATIENT)
Age: 49
End: 2024-04-12

## 2024-04-12 ENCOUNTER — RESULT CHARGE (OUTPATIENT)
Age: 49
End: 2024-04-12

## 2024-04-12 ENCOUNTER — APPOINTMENT (OUTPATIENT)
Dept: FAMILY MEDICINE | Facility: CLINIC | Age: 49
End: 2024-04-12
Payer: COMMERCIAL

## 2024-04-12 VITALS
TEMPERATURE: 97.9 F | BODY MASS INDEX: 26.61 KG/M2 | SYSTOLIC BLOOD PRESSURE: 102 MMHG | WEIGHT: 214 LBS | DIASTOLIC BLOOD PRESSURE: 70 MMHG | HEART RATE: 64 BPM | HEIGHT: 75 IN | OXYGEN SATURATION: 95 %

## 2024-04-12 DIAGNOSIS — Z00.00 ENCOUNTER FOR GENERAL ADULT MEDICAL EXAMINATION W/OUT ABNORMAL FINDINGS: ICD-10-CM

## 2024-04-12 DIAGNOSIS — E78.5 HYPERLIPIDEMIA, UNSPECIFIED: ICD-10-CM

## 2024-04-12 DIAGNOSIS — I10 ESSENTIAL (PRIMARY) HYPERTENSION: ICD-10-CM

## 2024-04-12 LAB
BILIRUB UR QL STRIP: NORMAL
CLARITY UR: CLEAR
COLLECTION METHOD: NORMAL
GLUCOSE UR-MCNC: NORMAL
HCG UR QL: 0.2 EU/DL
HGB UR QL STRIP.AUTO: NORMAL
KETONES UR-MCNC: NORMAL
LEUKOCYTE ESTERASE UR QL STRIP: NORMAL
NITRITE UR QL STRIP: NORMAL
PH UR STRIP: 5.5
PROT UR STRIP-MCNC: NORMAL
SP GR UR STRIP: 1.02

## 2024-04-12 PROCEDURE — 93000 ELECTROCARDIOGRAM COMPLETE: CPT

## 2024-04-12 PROCEDURE — 36415 COLL VENOUS BLD VENIPUNCTURE: CPT

## 2024-04-12 PROCEDURE — 99396 PREV VISIT EST AGE 40-64: CPT

## 2024-04-12 PROCEDURE — 99173 VISUAL ACUITY SCREEN: CPT

## 2024-04-12 PROCEDURE — 81003 URINALYSIS AUTO W/O SCOPE: CPT | Mod: QW

## 2024-04-12 PROCEDURE — 92551 PURE TONE HEARING TEST AIR: CPT

## 2024-04-12 NOTE — HEALTH RISK ASSESSMENT
[Yes] : Yes [2 - 3 times a week (3 pts)] : 2 - 3  times a week (3 points) [1 or 2 (0 pts)] : 1 or 2 (0 points) [Never (0 pts)] : Never (0 points) [No] : In the past 12 months have you used drugs other than those required for medical reasons? No [No falls in past year] : Patient reported no falls in the past year [0] : 2) Feeling down, depressed, or hopeless: Not at all (0) [PHQ-2 Negative - No further assessment needed] : PHQ-2 Negative - No further assessment needed [HIV test declined] : HIV test declined [Hepatitis C test declined] : Hepatitis C test declined [With Family] : lives with family [# of Members in Household ___] :  household currently consist of [unfilled] member(s) [Employed] : employed [College] : College [] :  [# Of Children ___] : has [unfilled] children [Sexually Active] : sexually active [Feels Safe at Home] : Feels safe at home [Fully functional (bathing, dressing, toileting, transferring, walking, feeding)] : Fully functional (bathing, dressing, toileting, transferring, walking, feeding) [Fully functional (using the telephone, shopping, preparing meals, housekeeping, doing laundry, using] : Fully functional and needs no help or supervision to perform IADLs (using the telephone, shopping, preparing meals, housekeeping, doing laundry, using transportation, managing medications and managing finances) [Reports normal functional visual acuity (ie: able to read med bottle)] : Reports normal functional visual acuity [Carbon Monoxide Detector] : carbon monoxide detector [Seat Belt] :  uses seat belt [Sunscreen] : uses sunscreen [Travel to Developing Areas] : travel to developing areas [Never] : Never [Audit-CScore] : 3 [IFF9Gwmei] : 0 [Change in mental status noted] : No change in mental status noted [Language] : denies difficulty with language [Behavior] : denies difficulty with behavior [Learning/Retaining New Information] : denies difficulty learning/retaining new information [Handling Complex Tasks] : denies difficulty handling complex tasks [Reasoning] : denies difficulty with reasoning [Spatial Ability and Orientation] : denies difficulty with spatial ability and orientation [Reports changes in hearing] : Reports no changes in hearing [Reports changes in vision] : Reports no changes in vision [Reports changes in dental health] : Reports no changes in dental health [Smoke Detector] : no smoke detector [Guns at Home] : no guns at home [TB Exposure] : is not being exposed to tuberculosis [Caregiver Concerns] : does not have caregiver concerns [FreeTextEntry2] : sales

## 2024-04-12 NOTE — PHYSICAL EXAM

## 2024-04-15 LAB
ALBUMIN SERPL ELPH-MCNC: 4.5 G/DL
ALP BLD-CCNC: 57 U/L
ALT SERPL-CCNC: 21 U/L
ANION GAP SERPL CALC-SCNC: 15 MMOL/L
AST SERPL-CCNC: 17 U/L
BASOPHILS # BLD AUTO: 0.08 K/UL
BASOPHILS NFR BLD AUTO: 1.3 %
BILIRUB SERPL-MCNC: 0.4 MG/DL
BUN SERPL-MCNC: 18 MG/DL
CALCIUM SERPL-MCNC: 9.4 MG/DL
CHLORIDE SERPL-SCNC: 103 MMOL/L
CHOLEST SERPL-MCNC: 200 MG/DL
CO2 SERPL-SCNC: 24 MMOL/L
CREAT SERPL-MCNC: 1.25 MG/DL
EGFR: 71 ML/MIN/1.73M2
EOSINOPHIL # BLD AUTO: 0.18 K/UL
EOSINOPHIL NFR BLD AUTO: 2.9 %
GLUCOSE SERPL-MCNC: 102 MG/DL
HCT VFR BLD CALC: 47.9 %
HDLC SERPL-MCNC: 60 MG/DL
HGB BLD-MCNC: 15.5 G/DL
IMM GRANULOCYTES NFR BLD AUTO: 0.5 %
LDLC SERPL CALC-MCNC: 114 MG/DL
LYMPHOCYTES # BLD AUTO: 2.17 K/UL
LYMPHOCYTES NFR BLD AUTO: 35.1 %
MAN DIFF?: NORMAL
MCHC RBC-ENTMCNC: 31.8 PG
MCHC RBC-ENTMCNC: 32.4 GM/DL
MCV RBC AUTO: 98.2 FL
MONOCYTES # BLD AUTO: 0.61 K/UL
MONOCYTES NFR BLD AUTO: 9.9 %
NEUTROPHILS # BLD AUTO: 3.12 K/UL
NEUTROPHILS NFR BLD AUTO: 50.3 %
NONHDLC SERPL-MCNC: 139 MG/DL
PLATELET # BLD AUTO: 272 K/UL
POTASSIUM SERPL-SCNC: 4.6 MMOL/L
PROT SERPL-MCNC: 6.8 G/DL
PSA FREE FLD-MCNC: 42 %
PSA FREE SERPL-MCNC: 0.27 NG/ML
PSA SERPL-MCNC: 0.65 NG/ML
RBC # BLD: 4.88 M/UL
RBC # FLD: 13.1 %
SODIUM SERPL-SCNC: 142 MMOL/L
T3FREE SERPL-MCNC: 4 PG/ML
T4 FREE SERPL-MCNC: 1.3 NG/DL
TRIGL SERPL-MCNC: 145 MG/DL
TSH SERPL-ACNC: 3.08 UIU/ML
WBC # FLD AUTO: 6.19 K/UL

## 2024-06-10 ENCOUNTER — RX RENEWAL (OUTPATIENT)
Age: 49
End: 2024-06-10

## 2024-06-10 RX ORDER — AMLODIPINE AND ATORVASTATIN 5; 20 MG/1; MG/1
5-20 TABLET, COATED ORAL
Qty: 90 | Refills: 0 | Status: ACTIVE | COMMUNITY
Start: 2020-12-22 | End: 1900-01-01

## 2024-06-12 RX ORDER — FENOFIBRATE 145 MG/1
145 TABLET, COATED ORAL DAILY
Qty: 90 TABLET | Refills: 1 | Status: SHIPPED | OUTPATIENT
Start: 2024-06-12

## 2024-06-12 RX ORDER — LEVOTHYROXINE SODIUM 0.05 MG/1
50 TABLET ORAL DAILY
Qty: 90 TABLET | Refills: 1 | Status: SHIPPED | OUTPATIENT
Start: 2024-06-12

## 2024-07-18 RX ORDER — ROSUVASTATIN CALCIUM 10 MG/1
10 TABLET, COATED ORAL NIGHTLY
Qty: 90 TABLET | Refills: 1 | Status: SHIPPED | OUTPATIENT
Start: 2024-07-18

## 2024-07-18 RX ORDER — LISINOPRIL 40 MG/1
40 TABLET ORAL DAILY
Qty: 90 TABLET | Refills: 1 | Status: SHIPPED | OUTPATIENT
Start: 2024-07-18

## 2024-09-04 ENCOUNTER — OFFICE VISIT (OUTPATIENT)
Dept: FAMILY MEDICINE CLINIC | Facility: CLINIC | Age: 49
End: 2024-09-04
Payer: COMMERCIAL

## 2024-09-04 VITALS
HEART RATE: 55 BPM | HEIGHT: 69 IN | DIASTOLIC BLOOD PRESSURE: 64 MMHG | OXYGEN SATURATION: 98 % | WEIGHT: 210 LBS | RESPIRATION RATE: 16 BRPM | SYSTOLIC BLOOD PRESSURE: 124 MMHG | BODY MASS INDEX: 31.1 KG/M2 | TEMPERATURE: 98 F

## 2024-09-04 DIAGNOSIS — M79.671 RIGHT FOOT PAIN: ICD-10-CM

## 2024-09-04 DIAGNOSIS — Z00.00 WELLNESS EXAMINATION: Primary | ICD-10-CM

## 2024-09-04 DIAGNOSIS — Z12.5 SCREENING FOR MALIGNANT NEOPLASM OF PROSTATE: ICD-10-CM

## 2024-09-04 PROCEDURE — 99396 PREV VISIT EST AGE 40-64: CPT | Performed by: FAMILY MEDICINE

## 2024-09-04 NOTE — PATIENT INSTRUCTIONS
Thank you for choosing Piyush Galloway MD at [facility]  To Do: Winston Martin  1. Please see age appropriate health prevention below     Call [phone] to schedule the appointment.   Please signup for TransEngen, which is electronic access to your record if you have not done so.  All your results will post on there.  [email or web address]   You can NOW use TransEngen to book your appointments with us, or consider using open access scheduling which is through the [place] website [email or web address] and type in Piyush Galloway MD and follow the links for \"Schedule Online Now\"    To schedule Imaging or tests at [place] call Central Scheduling [phone], Go to [facility] A ER Building (For example: CT scans, X rays, Ultrasound, MRI)  Cardiac Testing in ER building Building A second floor Cardiac Testing [phone] (For example: Holter Monitor, Cardiac Stress tests,Event Monitor, or 2D Echocardiograms)  [facility] call [phone] usually in [facility] A  Walk in Clinic in [place] at [address] Mon-Fri at 8am-7:30 p.m., and Sat/Sun 9:00a.m.-4:30 p.m.  Also at [address] [place] [address]  Call [phone] for info     Please call our office about any questions regarding your treatment/medicines/tests as a result of today's visit.  For your safety, read the entire package insert of all medicines prescribed to you and be aware of all of the risks of treatment even beyond those discussed today.  All therapies have potential risk of harm or side effects or medication interactions.  It is your duty and for your safety to discuss with the pharmacist and our office with questions, and to notify us and stop treatment if problems arise, but know that our intention is that the benefits outweigh those potential risks and we strive to make you healthier and to improve your quality of life.    Referrals, and Radiology Information:    If your insurance requires a referral to a specialist,  please allow 5 business days to process your referral request.    If Piyush Galloway MD orders a CT or MRI, it may take up to 10 business days to receive approval from your insurance company. Once our office has called informing you that the insurance company approved your testing, please call Central Scheduling at [phone]  Please allow our office 5 business days to contact you regarding any testing results.    Refill policies:   Allow 3 business days for refills; controlled substances may take longer and must be picked up from the office in person.  Narcotic medications can only be filled in 30 day increments and must be refilled at an office visit only.  If your prescription is due for a refill, you may be due for a follow-up appointment.  We cannot refill your maintenance medications at a preventative wellness visit.  To best provide you care, patients receiving maintenance medications need to be seen at least twice a year.

## 2024-09-04 NOTE — PROGRESS NOTES
Wellness Exam    REASON FOR VISIT:    Brett Lee is a 49 year old male who presents for an Annual Health Assessment.    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  For about 6 months, he has been experiencing right foot pain involving the second and third metatarsal areas.  Recently he and his family had moved to a different house.  No recent injury or trauma.  He does not have any numbness on the right.  He does have numbness on his left foot due to lumbar radiculopathy.  He has been using his CPAP machine.  He feels tired however.  He is due to have his labs done.  He has been taking his meds compliantly I had reviewed past medical and family histories together with allergy and medication lists documented.    Details about the complaints:  N/A    General Health                                   At any time do you feel concerned for the safety/well-being of yourself and/or your children, in your home or elsewhere?: No     CAGE:     Cut: Have you ever felt you should Cut down on your drinking?: No    Annoyed: Have people Annoyed you by criticizing your drinking?: No    Guilty: Have you ever felt bad or Guilty about your drinking?: No    Eye Opener: Have you ever had a drink first thing in the morning to steady your nerves or to get rid of a hangover (Eye opener)?: No    Scoring  Total Score: 0     PHQ-4: Over the LAST 2 WEEKS       Depression Screening (PHQ-2/PHQ-9): Over the LAST 2 WEEKS                             PREVENTATIVE SERVICES  INDICATIONS AND SCHEDULE Recommendation Internal Lab or Procedure External Lab or Procedure   Colonoscopy Screen Every 10 years Health Maintenance   Topic Date Due    Colorectal Cancer Screening  11/19/2032       Flex Sigmoidoscopy Screen  Every 5 years No results found for this or any previous visit.    Fecal Occult Blood  Annually No results found for: \"FOB\", \"OCCULTSTOOL\"    Obesity Screening Screen all adults annually Body mass index is 31.01 kg/m².      Preventive Services for Which Recommendations Vary with Risk Recommendation Internal Lab or Procedure External Lab or Procedure   Cholesterol Screening Recommended screening varies with age, risk and gender LDL Cholesterol (mg/dL)   Date Value   06/30/2022 105 (H)     LDL-CHOLESTEROL (mg/dL (calc))   Date Value   07/11/2014 148 (H)       Diabetes Screening  If history of high blood pressure or other  risk factors HgbA1C (%)   Date Value   07/18/2016 5.4     Glucose (mg/dL)   Date Value   06/30/2022 93     GLUCOSE (mg/dL)   Date Value   07/11/2014 88         Gonorrhea Screening If high risk No results found for: \"GONOCOCCUS\"    HIV Screening For all adults age 18-65, older adults at increased risk No results found for: \"HIV\"    Syphilis Screening Screen if pregnant or high risk No results found for: \"RPR\"    Hepatitis C Screening Screen those at high risk plus screen one time for adults born 1944-1 9[birth year] No results found for: \"HCVAB\"    Tuberculosis Screen If high risk No components found for: \"PPDINDURAT\"      ALLERGIES:     Allergies   Allergen Reactions    Atorvastatin MYALGIA    Simvastatin MYALGIA       CURRENT MEDICATIONS:   Current Outpatient Medications   Medication Sig Dispense Refill    LISINOPRIL 40 MG Oral Tab TAKE 1 TABLET BY MOUTH EVERY DAY 90 tablet 1    ROSUVASTATIN 10 MG Oral Tab TAKE 1 TABLET BY MOUTH EVERY DAY AT NIGHT 90 tablet 1    FENOFIBRATE 145 MG Oral Tab TAKE 1 TABLET BY MOUTH EVERY DAY 90 tablet 1     LEVOTHYROXINE 50 MCG Oral Tab TAKE 1 TABLET BY MOUTH EVERY DAY 90 tablet 1    PARoxetine 20 MG Oral Tab Take 1 tablet (20 mg total) by mouth daily. 90 tablet 3    Multiple Vitamins-Minerals (MULTIVITAMIN ADULTS OR) Take by mouth.      Omega-3 Fatty Acids (FISH OIL OR) Take by mouth.        MEDICAL INFORMATION:   Past Medical History:    Allergic rhinitis    Anxiety state, unspecified    Back pain    Back problem    Blood in the stool    Decorative tattoo    HIGH BLOOD PRESSURE    High cholesterol    Hyperlipidemia    Irregular bowel habits    Lipid screening    PEGGY on CPAP    Thyroid disease    Unspecified essential hypertension    Wears glasses      Past Surgical History:   Procedure Laterality Date    Back surgery  11-7-13    L5-S1 ANNELIESE     Injection, anesthetic/steroid, transforaminal epidural; lumbar/sacral, single level  9/6/2013    Procedure: TRANSFORAMINAL EPIDURAL - LUMBAR;  Surgeon: Gregg Gibbons MD;  Location: [facility]    Injection, anesthetic/steroid, transforaminal epidural; lumbar/sacral, single level  9/27/2013    Procedure: TRANSFORAMINAL EPIDURAL - LUMBAR;  Surgeon: Gregg Gibbons MD;  Location: [facility]      Family History   Problem Relation Age of Onset    Hypertension Father         CVA 65    Hypertension Mother     Heart Disease Paternal Grandmother         CHF    Colon Cancer Paternal Aunt     Heart Disorder Maternal Grandfather         CAD 40s, CHF    Cancer Paternal Grandfather         CLL      SOCIAL HISTORY:   Social History     Socioeconomic History    Marital status:    Tobacco Use    Smoking status: Never    Smokeless tobacco: Never   Substance and Sexual Activity    Alcohol use: Yes     Alcohol/week: 2.0 standard drinks of alcohol     Types: 2 Cans of beer per week     Comment: 2-3 times a month    Drug use: No   Other Topics Concern    Caffeine Concern Yes     Comment: Daily; 3-4 cups     Exercise No          REVIEW OF SYSTEMS:    Constitutional: Negative for fever, chills   HENT: Negative for hearing loss, congestion, sore throat and neck pain.    Eyes: Negative for pain and visual disturbance.   Respiratory: Negative for cough and shortness of breath.    Cardiovascular: Negative for chest pain and palpitations.   Gastrointestinal: Negative for nausea, vomiting, abdominal pain and diarrhea.   Genitourinary: Negative for urgency, frequency and difficulty urinating.   Musculoskeletal: Negative for arthralgias and no gait problem.   Skin: Negative for color change and rash.   Neurological: Negative for tremors, weakness   Hematological: Negative for adenopathy. Does not bruise/bleed easily.   Psychiatric/Behavioral: Negative for confusion and agitation. The patient is not nervous/anxious.      EXAM:   /64   Pulse 55   Temp 98 °F (36.7 °C) (Temporal)   Resp 16   Ht 5' 9\" (1.753 m)   Wt 210 lb (95.3 kg)   SpO2 98%   BMI 31.01 kg/m²   Constitutional: He appears well-developed, nourished, and his stated age. Vital signs reviewed  Pleasant man   Head: Normocephalic and atraumatic.   Ear: TMs visible and normal bilaterally  Nose: Nose normal.   Eyes: EOM are normal. Pupils are equal, round, and reactive to light. No scleral icterus.   Neck: Normal range of motion. No thyromegaly present.   Cardiovascular: Normal rate, regular rhythm and normal heart sounds.  Exam reveals no friction rub.    No murmur heard.  Pulmonary/Chest: Effort normal and breath sounds normal. He has no wheezes. He has no rales.   Abdominal: Soft. Bowel sounds are normal. There is no tenderness.   Musculoskeletal: Normal range of motion. He exhibits no edema.   Examination of the right foot reveals no erythema no warmth no swelling.  Normal DP pulse.  Intact sensation and strength.  However there is tenderness to palpation on the plantar aspect of the second and third metatarsal areas of the right foot.  Lymphadenopathy:     He has no cervical adenopathy.    Neurological: He is alert and oriented to person, place, and time. He has normal reflexes.   Skin: Skin is warm. No rash noted. No erythema. with normal hair  Psychiatric: He has a normal mood and affect. His behavior is normal.     ASSESSMENT AND OTHER RELEVANT CHRONIC CONDITIONS:   Brett Lee is a 49 year old male who presents for an Annual Health Assessment.   1. Wellness examination    2. Right foot pain    3. Screening for malignant neoplasm of prostate        PLAN SUMMARY:   Brett Lee is a 49 year old male  Age appropriate cancer screening, labs, safety, immunizations were discussed with the patient and ordered as follows:  Diagnoses and all orders for this visit:    Wellness examination  -     CBC W Differential W Platelet [E]; Future  -     Comp Metabolic Panel (14) [E]; Future  -     Lipid Panel [E]; Future  -     TSH and Free T4 [E]; Future    Right foot pain  -Likely due to plantar fasciitis with possible tendinitis  - Encouraged him to use over-the-counter anti-inflammatories such as ibuprofen with food  - May do measures at home in which he could use a roller or warm can of corn kernel or piece to affected area  - If with no considerable improvement I need to refer him to podiatry.  Screening for malignant neoplasm of prostate  -     PSA, Total (Screening) [E]; Future  We shall check routine labs and notify him once we get test results provide him recommendations thereafter.  Continue with current medication regimen.  Follow-up in 1 year or as needed      This note was prepared using Dragon Medical voice recognition dictation software. As a result errors may occur. When identified these errors have been corrected. While every attempt is made to correct errors during dictation discrepancies may still exist              Orders Placed This Encounter   Procedures    CBC W Differential W Platelet [E]    Comp Metabolic Panel (14) [E]    Lipid Panel [E]    TSH and Free T4 [E]    PSA, Total  (Screening) [E]       Imaging & Consults:  None    His 5 year prevention plan includes: annual visits for fasting labs  Health Maintenance   Topic Date Due    DTaP,Tdap,and Td Vaccines (1 - Tdap) Never done    Annual Depression Screening  01/01/2024    Annual Physical  07/12/2024    COVID-19 Vaccine (4 - 2023-24 season) 09/01/2024    Influenza Vaccine (1) 10/01/2024    Colorectal Cancer Screening  11/19/2032    Pneumococcal Vaccine: Birth to 64yrs  Aged Out     Patient/Caregiver Education:  Patient/Caregiver Education: There are no barriers to learning. Medical education done.  Outcome: Patient verbalizes understanding.    Educated by: MD   The patient indicates understanding of these issues and agrees to the plan.    SUGGESTED VACCINATIONS - Influenza, Pneumococcal, Zoster, Tetanus     Immunization History   Administered Date(s) Administered    Covid-19 Vaccine Pfizer 30 mcg/0.3 ml 12/20/2020, 01/10/2021, 12/02/2021    FLULAVAL 6 months & older 0.5 ml Prefilled syringe ([id number]) 10/10/2022    Flulaval, 3 Years & >, IM 09/28/2020    Influenza 11/08/2013, 10/13/2016, 10/15/2019       Influenza Annually   Pneumococcal if high risk   Td/Tdap once then every 10 years   HPV Males 11-21   Zoster (Shingles) 60 and older: one dose   Varicella 2 doses if not immune   MMR 1-2 doses if born after 1955 and not immune     Patient Active Problem List   Diagnosis    Mixed hyperlipidemia    Screening for lipoid disorders    Irritable bowel syndrome    Essential hypertension    Renal artery stenosis (HCC)    Herniated lumbar intervertebral disc    Lumbar radiculopathy    Foot drop, right    Calcified granuloma of lung (HCC)    Anxiety    Special screening for malignant neoplasm of colon    Obstructive sleep apnea    Class 1 obesity due to excess calories without serious comorbidity with body mass index (BMI) of 30.0 to 30.9 in adult    Hypersomnia     PREVENTIVE VISIT,EST,40-64

## 2024-09-09 ENCOUNTER — RX RENEWAL (OUTPATIENT)
Age: 49
End: 2024-09-09

## 2024-09-11 ENCOUNTER — RX RENEWAL (OUTPATIENT)
Age: 49
End: 2024-09-11

## 2024-09-20 ENCOUNTER — APPOINTMENT (OUTPATIENT)
Dept: FAMILY MEDICINE | Facility: CLINIC | Age: 49
End: 2024-09-20
Payer: COMMERCIAL

## 2024-09-20 VITALS
HEART RATE: 79 BPM | TEMPERATURE: 98.7 F | BODY MASS INDEX: 26.61 KG/M2 | WEIGHT: 214 LBS | DIASTOLIC BLOOD PRESSURE: 80 MMHG | OXYGEN SATURATION: 98 % | SYSTOLIC BLOOD PRESSURE: 110 MMHG | HEIGHT: 75 IN

## 2024-09-20 DIAGNOSIS — Z23 ENCOUNTER FOR IMMUNIZATION: ICD-10-CM

## 2024-09-20 DIAGNOSIS — E78.5 HYPERLIPIDEMIA, UNSPECIFIED: ICD-10-CM

## 2024-09-20 DIAGNOSIS — I10 ESSENTIAL (PRIMARY) HYPERTENSION: ICD-10-CM

## 2024-09-20 PROCEDURE — 99214 OFFICE O/P EST MOD 30 MIN: CPT | Mod: 25

## 2024-09-20 PROCEDURE — 90656 IIV3 VACC NO PRSV 0.5 ML IM: CPT

## 2024-09-20 PROCEDURE — G0008: CPT

## 2024-09-20 NOTE — HISTORY OF PRESENT ILLNESS
[Hypertension] : Hypertension [FreeTextEntry6] : Pt is here for BP check. [Does not check BP] : The patient is not checking blood pressure [<140/90] : Target blood pressure is <140/90

## 2024-09-25 RX ORDER — PAROXETINE 20 MG/1
20 TABLET, FILM COATED ORAL DAILY
Qty: 90 TABLET | Refills: 3 | Status: SHIPPED | OUTPATIENT
Start: 2024-09-25

## 2024-09-30 RX ORDER — FENOFIBRATE 145 MG/1
145 TABLET, COATED ORAL DAILY
Qty: 90 TABLET | Refills: 1 | Status: SHIPPED | OUTPATIENT
Start: 2024-09-30

## 2024-12-18 RX ORDER — LEVOTHYROXINE SODIUM 50 UG/1
50 TABLET ORAL DAILY
Qty: 90 TABLET | Refills: 1 | Status: SHIPPED | OUTPATIENT
Start: 2024-12-18

## 2025-01-24 RX ORDER — ROSUVASTATIN CALCIUM 10 MG/1
10 TABLET, COATED ORAL NIGHTLY
Qty: 90 TABLET | Refills: 1 | Status: SHIPPED | OUTPATIENT
Start: 2025-01-24

## 2025-02-14 RX ORDER — LISINOPRIL 40 MG/1
40 TABLET ORAL DAILY
Qty: 90 TABLET | Refills: 1 | Status: SHIPPED | OUTPATIENT
Start: 2025-02-14

## 2025-03-14 ENCOUNTER — APPOINTMENT (OUTPATIENT)
Dept: FAMILY MEDICINE | Facility: CLINIC | Age: 50
End: 2025-03-14
Payer: COMMERCIAL

## 2025-03-14 VITALS
SYSTOLIC BLOOD PRESSURE: 130 MMHG | WEIGHT: 214 LBS | OXYGEN SATURATION: 97 % | BODY MASS INDEX: 26.61 KG/M2 | HEART RATE: 65 BPM | HEIGHT: 75 IN | TEMPERATURE: 98.5 F | DIASTOLIC BLOOD PRESSURE: 90 MMHG

## 2025-03-14 DIAGNOSIS — I10 ESSENTIAL (PRIMARY) HYPERTENSION: ICD-10-CM

## 2025-03-14 DIAGNOSIS — E78.5 HYPERLIPIDEMIA, UNSPECIFIED: ICD-10-CM

## 2025-03-14 PROCEDURE — 99214 OFFICE O/P EST MOD 30 MIN: CPT

## 2025-03-14 NOTE — HISTORY OF PRESENT ILLNESS
[Hyperlipidemia] : Hyperlipidemia [Hypertension] : Hypertension [FreeTextEntry6] : pt is here for BP CHECK [Does not check BP] : The patient is not checking blood pressure [<140/90] : Target blood pressure is <140/90 [Target goal met] : BP target goal met [Managed with medications] : managed with  medication [Moderate Intensity Therapy] : Patient is currently on moderate intensity statin  therapy

## 2025-04-05 ENCOUNTER — NON-APPOINTMENT (OUTPATIENT)
Age: 50
End: 2025-04-05

## 2025-05-16 ENCOUNTER — APPOINTMENT (OUTPATIENT)
Dept: FAMILY MEDICINE | Facility: CLINIC | Age: 50
End: 2025-05-16
Payer: COMMERCIAL

## 2025-05-16 ENCOUNTER — RESULT CHARGE (OUTPATIENT)
Age: 50
End: 2025-05-16

## 2025-05-16 ENCOUNTER — NON-APPOINTMENT (OUTPATIENT)
Age: 50
End: 2025-05-16

## 2025-05-16 VITALS
HEART RATE: 69 BPM | SYSTOLIC BLOOD PRESSURE: 126 MMHG | HEIGHT: 75 IN | BODY MASS INDEX: 26.36 KG/M2 | WEIGHT: 212 LBS | OXYGEN SATURATION: 99 % | DIASTOLIC BLOOD PRESSURE: 80 MMHG | TEMPERATURE: 98 F

## 2025-05-16 DIAGNOSIS — I10 ESSENTIAL (PRIMARY) HYPERTENSION: ICD-10-CM

## 2025-05-16 DIAGNOSIS — Z00.00 ENCOUNTER FOR GENERAL ADULT MEDICAL EXAMINATION W/OUT ABNORMAL FINDINGS: ICD-10-CM

## 2025-05-16 DIAGNOSIS — N52.9 MALE ERECTILE DYSFUNCTION, UNSPECIFIED: ICD-10-CM

## 2025-05-16 DIAGNOSIS — E78.5 HYPERLIPIDEMIA, UNSPECIFIED: ICD-10-CM

## 2025-05-16 LAB
BILIRUB UR QL STRIP: NORMAL
GLUCOSE UR-MCNC: NORMAL
HCG UR QL: 0.2 EU/DL
HGB UR QL STRIP.AUTO: NORMAL
KETONES UR-MCNC: NORMAL
LEUKOCYTE ESTERASE UR QL STRIP: NORMAL
NITRITE UR QL STRIP: NORMAL
PH UR STRIP: 5.5
PROT UR STRIP-MCNC: NORMAL
SP GR UR STRIP: 1.02

## 2025-05-16 PROCEDURE — 92551 PURE TONE HEARING TEST AIR: CPT

## 2025-05-16 PROCEDURE — 99173 VISUAL ACUITY SCREEN: CPT

## 2025-05-16 PROCEDURE — G0439: CPT

## 2025-05-16 PROCEDURE — 36415 COLL VENOUS BLD VENIPUNCTURE: CPT

## 2025-05-16 PROCEDURE — 93000 ELECTROCARDIOGRAM COMPLETE: CPT

## 2025-05-16 PROCEDURE — 81003 URINALYSIS AUTO W/O SCOPE: CPT | Mod: QW

## 2025-05-16 NOTE — ASSESSMENT
[Vaccines Reviewed] : Immunizations reviewed today. Please see immunization details in the vaccine log within the immunization flowsheet.  [FreeTextEntry1] : Advised at length

## 2025-05-16 NOTE — HEALTH RISK ASSESSMENT
[Yes] : Yes [2 - 4 times a month (2 pts)] : 2-4 times a month (2 points) [1 or 2 (0 pts)] : 1 or 2 (0 points) [Never (0 pts)] : Never (0 points) [No] : In the past 12 months have you used drugs other than those required for medical reasons? No [No falls in past year] : Patient reported no falls in the past year [0] : 2) Feeling down, depressed, or hopeless: Not at all (0) [PHQ-2 Negative - No further assessment needed] : PHQ-2 Negative - No further assessment needed [Never] : Never [NO] : No [HIV test declined] : HIV test declined [Hepatitis C test declined] : Hepatitis C test declined [With Family] : lives with family [# of Members in Household ___] :  household currently consist of [unfilled] member(s) [Employed] : employed [College] : College [] :  [# Of Children ___] : has [unfilled] children [Sexually Active] : sexually active [Feels Safe at Home] : Feels safe at home [Fully functional (bathing, dressing, toileting, transferring, walking, feeding)] : Fully functional (bathing, dressing, toileting, transferring, walking, feeding) [Fully functional (using the telephone, shopping, preparing meals, housekeeping, doing laundry, using] : Fully functional and needs no help or supervision to perform IADLs (using the telephone, shopping, preparing meals, housekeeping, doing laundry, using transportation, managing medications and managing finances) [Reports normal functional visual acuity (ie: able to read med bottle)] : Reports normal functional visual acuity [Smoke Detector] : smoke detector [Carbon Monoxide Detector] : carbon monoxide detector [Seat Belt] :  uses seat belt [Sunscreen] : uses sunscreen [Audit-CScore] : 2 [[id number]] : 0 [Change in mental status noted] : No change in mental status noted [Language] : denies difficulty with language [Behavior] : denies difficulty with behavior [Learning/Retaining New Information] : denies difficulty learning/retaining new information [Handling Complex Tasks] : denies difficulty handling complex tasks [Reasoning] : denies difficulty with reasoning [Spatial Ability and Orientation] : denies difficulty with spatial ability and orientation [Reports changes in hearing] : Reports no changes in hearing [Reports changes in vision] : Reports no changes in vision [Reports changes in dental health] : Reports no changes in dental health [Travel to Developing Areas] : does not  travel to developing areas [TB Exposure] : is not being exposed to tuberculosis

## 2025-05-16 NOTE — PHYSICAL EXAM

## 2025-05-17 LAB
ALBUMIN SERPL ELPH-MCNC: 4.5 G/DL
ALP BLD-CCNC: 55 U/L
ALT SERPL-CCNC: 32 U/L
ANION GAP SERPL CALC-SCNC: 16 MMOL/L
AST SERPL-CCNC: 33 U/L
BASOPHILS # BLD AUTO: 0.05 K/UL
BASOPHILS NFR BLD AUTO: 0.9 %
BILIRUB SERPL-MCNC: 0.8 MG/DL
BUN SERPL-MCNC: 20 MG/DL
CALCIUM SERPL-MCNC: 9.1 MG/DL
CHLORIDE SERPL-SCNC: 103 MMOL/L
CHOLEST SERPL-MCNC: 209 MG/DL
CO2 SERPL-SCNC: 21 MMOL/L
CREAT SERPL-MCNC: 1.38 MG/DL
EGFRCR SERPLBLD CKD-EPI 2021: 63 ML/MIN/1.73M2
EOSINOPHIL # BLD AUTO: 0.13 K/UL
EOSINOPHIL NFR BLD AUTO: 2.3 %
GLUCOSE SERPL-MCNC: 86 MG/DL
HCT VFR BLD CALC: 48.8 %
HDLC SERPL-MCNC: 53 MG/DL
HGB BLD-MCNC: 15.7 G/DL
IMM GRANULOCYTES NFR BLD AUTO: 0.2 %
LDLC SERPL-MCNC: 134 MG/DL
LYMPHOCYTES # BLD AUTO: 1.96 K/UL
LYMPHOCYTES NFR BLD AUTO: 35.3 %
MAN DIFF?: NORMAL
MCHC RBC-ENTMCNC: 31 PG
MCHC RBC-ENTMCNC: 32.2 G/DL
MCV RBC AUTO: 96.3 FL
MONOCYTES # BLD AUTO: 0.63 K/UL
MONOCYTES NFR BLD AUTO: 11.3 %
NEUTROPHILS # BLD AUTO: 2.78 K/UL
NEUTROPHILS NFR BLD AUTO: 50 %
NONHDLC SERPL-MCNC: 156 MG/DL
PLATELET # BLD AUTO: 244 K/UL
POTASSIUM SERPL-SCNC: 4.1 MMOL/L
PROT SERPL-MCNC: 7.2 G/DL
RBC # BLD: 5.07 M/UL
RBC # FLD: 13.9 %
SODIUM SERPL-SCNC: 139 MMOL/L
T3FREE SERPL-MCNC: 3.44 PG/ML
T4 FREE SERPL-MCNC: 1.4 NG/DL
TESTOST SERPL-MCNC: 499 NG/DL
TRIGL SERPL-MCNC: 121 MG/DL
TSH SERPL-ACNC: 2.22 UIU/ML
WBC # FLD AUTO: 5.56 K/UL

## 2025-06-06 ENCOUNTER — TRANSCRIPTION ENCOUNTER (OUTPATIENT)
Age: 50
End: 2025-06-06

## 2025-06-17 NOTE — TELEPHONE ENCOUNTER
Requesting Fenofibrate 145mg  Filled: 9/30/24 #90 with 1 refills    Requesting Levothyroxine 50mcg  Filled: 12/18/24 #90 with 1 refills    LOV: 9/4/24 Physical  RTC: 1 year  Last Relevant Labs: 6/30/22    No future appointments.

## 2025-06-26 RX ORDER — LEVOTHYROXINE SODIUM 50 UG/1
50 TABLET ORAL DAILY
Qty: 90 TABLET | Refills: 1 | Status: SHIPPED | OUTPATIENT
Start: 2025-06-26

## 2025-06-26 RX ORDER — FENOFIBRATE 145 MG/1
145 TABLET, FILM COATED ORAL DAILY
Qty: 90 TABLET | Refills: 1 | Status: SHIPPED | OUTPATIENT
Start: 2025-06-26

## 2025-06-30 RX ORDER — LISINOPRIL 40 MG/1
40 TABLET ORAL DAILY
Qty: 90 TABLET | Refills: 1 | OUTPATIENT
Start: 2025-06-30

## 2025-06-30 RX ORDER — FENOFIBRATE 145 MG/1
145 TABLET, FILM COATED ORAL DAILY
Qty: 90 TABLET | Refills: 1 | OUTPATIENT
Start: 2025-06-30

## 2025-06-30 RX ORDER — LEVOTHYROXINE SODIUM 50 UG/1
50 TABLET ORAL DAILY
Qty: 90 TABLET | Refills: 1 | OUTPATIENT
Start: 2025-06-30

## 2025-08-04 RX ORDER — LISINOPRIL 40 MG/1
40 TABLET ORAL DAILY
Qty: 30 TABLET | Refills: 0 | Status: SHIPPED | OUTPATIENT
Start: 2025-08-04

## (undated) NOTE — MR AVS SNAPSHOT
[facility] [address]  [place]  [phone]               Thank you for choosing us for your health care visit with Gurinder Villalobos MD.  We are glad to serve you and happy to provide you with [facility] 120/76 mmHg 72 98.5 °F (36.9 °C) (Temporal) 69\" 195 lb 12.8 oz 28.90 kg/m2         Current Medications          This list is accurate as of: 3/15/17  9:26 AM.  Always use your most recent med list.                HYDROcodone-acetaminophen  MG Tabs

## (undated) NOTE — LETTER
03/20/20        [place] [id number]      Dear Jerome Morales,    [address] records indicate that you have outstanding lab work and or testing that was ordered for you and has not yet been completed:  Orders Placed This Encounter

## (undated) NOTE — LETTER
01/14/19        [id number]      Dear Anibal Marquez,    [facility] records indicate that you have outstanding lab work and or testing that was ordered for you and has not yet been completed:      CBC W Differential W Plat

## (undated) NOTE — MR AVS SNAPSHOT
[facility] [address]  [place]  [phone]               Thank you for choosing us for your health care visit with Yousif Hogan MD.  We are glad to serve you and happy to provide you with [facility] HYDROcodone-acetaminophen  MG Tabs   Take 1 tablet by mouth 2 (two) times daily as needed for Pain. Commonly known as:  NORCO           lisinopril 40 MG Tabs   Take 1 tablet (40 mg total) by mouth daily.    Commonly known as:  PRINIVIL,ZESTRIL active are less likely to develop some chronic diseases than adults who are inactive.      HOW TO GET STARTED: HOW TO STAY MOTIVATED:   Start activities slowly and build up over time Do what you like   Get your heart pumping – brisk walking, biking, swimmin